# Patient Record
Sex: FEMALE | Race: WHITE | Employment: FULL TIME | URBAN - METROPOLITAN AREA
[De-identification: names, ages, dates, MRNs, and addresses within clinical notes are randomized per-mention and may not be internally consistent; named-entity substitution may affect disease eponyms.]

---

## 2017-03-02 ENCOUNTER — HOSPITAL ENCOUNTER (OUTPATIENT)
Dept: MRI IMAGING | Age: 52
Discharge: HOME | End: 2017-03-02
Attending: ORTHOPAEDIC SURGERY
Payer: COMMERCIAL

## 2017-03-02 ENCOUNTER — HOSPITAL ENCOUNTER (OUTPATIENT)
Dept: GENERAL RADIOLOGY | Age: 52
Discharge: HOME | End: 2017-03-02
Attending: ORTHOPAEDIC SURGERY
Payer: COMMERCIAL

## 2017-03-02 DIAGNOSIS — S43.002D UNSPECIFIED SUBLUXATION OF LEFT SHOULDER JOINT, SUBSEQUENT ENCOUNTER: ICD-10-CM

## 2017-03-02 PROCEDURE — A9585 GADOBUTROL INJECTION: HCPCS | Performed by: ORTHOPAEDIC SURGERY

## 2017-03-02 PROCEDURE — 74011250636 HC RX REV CODE- 250/636: Performed by: ORTHOPAEDIC SURGERY

## 2017-03-02 PROCEDURE — 73222 MRI JOINT UPR EXTREM W/DYE: CPT

## 2017-03-02 PROCEDURE — 74011636320 HC RX REV CODE- 636/320: Performed by: RADIOLOGY

## 2017-03-02 PROCEDURE — 23350 INJECTION FOR SHOULDER X-RAY: CPT

## 2017-03-02 PROCEDURE — 74011000250 HC RX REV CODE- 250: Performed by: RADIOLOGY

## 2017-03-02 RX ORDER — SODIUM CHLORIDE 9 MG/ML
3 INJECTION INTRAMUSCULAR; INTRAVENOUS; SUBCUTANEOUS
Status: COMPLETED | OUTPATIENT
Start: 2017-03-02 | End: 2017-03-02

## 2017-03-02 RX ORDER — LIDOCAINE HYDROCHLORIDE 10 MG/ML
10 INJECTION INFILTRATION; PERINEURAL
Status: COMPLETED | OUTPATIENT
Start: 2017-03-02 | End: 2017-03-02

## 2017-03-02 RX ADMIN — SODIUM CHLORIDE 10 ML: 9 INJECTION INTRAMUSCULAR; INTRAVENOUS; SUBCUTANEOUS at 15:40

## 2017-03-02 RX ADMIN — GADOBUTROL 2 ML: 604.72 INJECTION INTRAVENOUS at 17:00

## 2017-03-02 RX ADMIN — IOHEXOL 30 ML: 300 INJECTION, SOLUTION INTRAVENOUS at 15:40

## 2017-03-02 RX ADMIN — LIDOCAINE HYDROCHLORIDE 10 ML: 10 INJECTION, SOLUTION INFILTRATION; PERINEURAL at 15:40

## 2017-03-02 RX ADMIN — SODIUM BICARBONATE 5 ML: 0.2 INJECTION, SOLUTION INTRAVENOUS at 15:40

## 2017-08-07 RX ORDER — METHYLPHENIDATE HYDROCHLORIDE 20 MG/1
20 TABLET ORAL 2 TIMES DAILY
Qty: 60 TAB | Refills: 0 | Status: SHIPPED | OUTPATIENT
Start: 2017-08-07 | End: 2017-09-19 | Stop reason: SDUPTHER

## 2017-08-07 NOTE — TELEPHONE ENCOUNTER
Last Refill: 6/30/17  Last visit:6/30/17    Requested Prescriptions     Pending Prescriptions Disp Refills    methylphenidate HCl (RITALIN) 20 mg tablet 60 Tab 0     Sig: Take 1 Tab (20 mg total) by mouth two (2) times a day.   Max Daily Amount: 40 mg

## 2017-09-18 ENCOUNTER — PATIENT MESSAGE (OUTPATIENT)
Dept: INTERNAL MEDICINE CLINIC | Age: 52
End: 2017-09-18

## 2017-09-19 RX ORDER — METHYLPHENIDATE HYDROCHLORIDE 20 MG/1
20 TABLET ORAL 2 TIMES DAILY
Qty: 60 TAB | Refills: 0 | Status: SHIPPED | OUTPATIENT
Start: 2017-09-19 | End: 2017-10-23 | Stop reason: SDUPTHER

## 2017-09-19 NOTE — TELEPHONE ENCOUNTER
----- Message from Mehran Chang sent at 9/18/2017  3:36 PM EDT -----  Regarding: Prescription Question  Contact: [phone]  Can you please mail a prescription for methylphenidate 20 MG to the home address you have on file for me?     Thank you,  Azeem Rose

## 2017-10-23 RX ORDER — METHYLPHENIDATE HYDROCHLORIDE 20 MG/1
20 TABLET ORAL 2 TIMES DAILY
Qty: 60 TAB | Refills: 0 | Status: SHIPPED | OUTPATIENT
Start: 2017-10-23 | End: 2017-12-01 | Stop reason: SDUPTHER

## 2017-10-23 NOTE — TELEPHONE ENCOUNTER
Requested Prescriptions     Pending Prescriptions Disp Refills    methylphenidate HCl (RITALIN) 20 mg tablet 60 Tab 0     Sig: Take 1 Tab (20 mg total) by mouth two (2) times a day.   Max Daily Amount: 40 mg       Last Refill: 9-19-17  Last visit:6-30-17

## 2017-12-01 NOTE — TELEPHONE ENCOUNTER
Requested Prescriptions     Pending Prescriptions Disp Refills    methylphenidate HCl (RITALIN) 20 mg tablet 60 Tab 0     Sig: Take 1 Tab (20 mg total) by mouth two (2) times a day.   Max Daily Amount: 40 mg       Last Refill: 10-23-17  Last visit:6/30/2017

## 2017-12-04 RX ORDER — METHYLPHENIDATE HYDROCHLORIDE 20 MG/1
20 TABLET ORAL 2 TIMES DAILY
Qty: 60 TAB | Refills: 0 | Status: SHIPPED | OUTPATIENT
Start: 2017-12-04 | End: 2018-01-23 | Stop reason: SDUPTHER

## 2017-12-20 PROBLEM — E66.9 OBESITY (BMI 30-39.9): Status: ACTIVE | Noted: 2017-12-20

## 2017-12-20 PROBLEM — G47.11 IDIOPATHIC HYPERSOMNIA: Status: ACTIVE | Noted: 2017-12-20

## 2017-12-20 PROBLEM — J30.9 ALLERGIC RHINITIS: Status: ACTIVE | Noted: 2017-12-20

## 2017-12-20 PROBLEM — G47.26 SHIFT WORK SLEEP DISORDER: Status: ACTIVE | Noted: 2017-12-20

## 2017-12-20 PROBLEM — I10 ESSENTIAL HYPERTENSION: Status: ACTIVE | Noted: 2017-12-20

## 2017-12-20 PROBLEM — K50.90 CROHN'S DISEASE (HCC): Status: ACTIVE | Noted: 2017-12-20

## 2017-12-20 PROBLEM — F32.A DEPRESSION: Status: ACTIVE | Noted: 2017-12-20

## 2017-12-22 RX ORDER — MODAFINIL 200 MG/1
200 TABLET ORAL DAILY
COMMUNITY
End: 2018-01-02 | Stop reason: ALTCHOICE

## 2017-12-22 RX ORDER — HYDROCHLOROTHIAZIDE 25 MG/1
25 TABLET ORAL DAILY
COMMUNITY
End: 2018-03-05 | Stop reason: SDUPTHER

## 2017-12-22 RX ORDER — CITALOPRAM 20 MG/1
TABLET, FILM COATED ORAL DAILY
COMMUNITY
End: 2018-02-20 | Stop reason: SDUPTHER

## 2017-12-22 RX ORDER — MESALAMINE 500 MG/1
500 CAPSULE, EXTENDED RELEASE ORAL 3 TIMES DAILY
COMMUNITY

## 2018-01-02 ENCOUNTER — OFFICE VISIT (OUTPATIENT)
Dept: INTERNAL MEDICINE CLINIC | Age: 53
End: 2018-01-02

## 2018-01-02 VITALS
WEIGHT: 255.6 LBS | SYSTOLIC BLOOD PRESSURE: 130 MMHG | BODY MASS INDEX: 41.08 KG/M2 | HEART RATE: 70 BPM | DIASTOLIC BLOOD PRESSURE: 80 MMHG | HEIGHT: 66 IN

## 2018-01-02 DIAGNOSIS — K50.90 CROHN'S DISEASE WITHOUT COMPLICATION, UNSPECIFIED GASTROINTESTINAL TRACT LOCATION (HCC): ICD-10-CM

## 2018-01-02 DIAGNOSIS — I10 ESSENTIAL HYPERTENSION: Primary | ICD-10-CM

## 2018-01-02 DIAGNOSIS — F32.A DEPRESSION, UNSPECIFIED DEPRESSION TYPE: ICD-10-CM

## 2018-01-02 DIAGNOSIS — E66.01 OBESITY, MORBID (HCC): ICD-10-CM

## 2018-01-02 DIAGNOSIS — G47.11 IDIOPATHIC HYPERSOMNIA: ICD-10-CM

## 2018-01-02 NOTE — PROGRESS NOTES
This note will not be viewable in [address] Delfina Turner is a 47 y.o. female and presents with Hypertension (6 mo fu)  . Subjective:  Mrs. Odessa Vaca returns to our office today in follow-up of multiple medical problems. The patient has hypertension for which she is on hydrochlorothiazide. She tolerates medicine without dizziness, muscle cramping or lower extremity edema. She has had no headaches, numbness, tingling or focal neurological problems. She has had a history of depression and is on Celexa. She tolerates this without any headaches or GI symptoms. Depressive symptomatology has been well controlled and there is been no exacerbation over the holidays. The patient has Crohn's disease without complication. She is currently on Pentasa for this. She did have a colonoscopy in August.  She denies diarrhea or bleeding. The patient has idiopathic hypersomnia. She currently is on twice daily Ritalin for this. She states if it was not for the Ritalin she would sleep all day. She does sleep well at night. She is now working day shift and has not had to take any type of medication for nighttime work. The patient has morbid obesity and states that she will be starting a diet. She is not exercising due to her work. Past Medical History:   Diagnosis Date    Allergic rhinitis 12/20/2017    Crohn's disease ([address] 12/20/2017    Depression 12/20/2017    Essential hypertension 12/20/2017    Idiopathic hypersomnia 12/20/2017    Obesity (BMI 30-39.9) 12/20/2017    Shift work sleep disorder 12/20/2017     Past Surgical History:   Procedure Laterality Date    HX ORTHOPAEDIC       No Known Allergies  Current Outpatient Prescriptions   Medication Sig Dispense Refill    citalopram (CELEXA) 20 mg tablet Take  by mouth daily.  hydroCHLOROthiazide (HYDRODIURIL) 25 mg tablet Take 25 mg by mouth daily.  mesalamine (PENTASA) 500 mg CR capsule Take 500 mg by mouth four (4) times daily.       methylphenidate HCl (RITALIN) 20 mg tablet Take 1 Tab (20 mg total) by mouth two (2) times a dayEarliest Fill Date: 12/4/17.   Max Daily Amount: 40 mg 60 Tab 0     Social History     Social History    Marital status:      Spouse name: N/A    Number of children: N/A    Years of education: N/A     Social History Main Topics    Smoking status: Never Smoker    Smokeless tobacco: Never Used    Alcohol use None    Drug use: None    Sexual activity: Not Asked     Other Topics Concern    None     Social History Narrative     Family History   Problem Relation Age of Onset    Hypertension Mother     Diabetes Mother     Diabetes Father     Cancer Father        Health Maintenance   Topic Date Due    Hepatitis C Screening  1965    DTaP/Tdap/Td series (1 - Tdap) 02/18/1985    PAP AKA CERVICAL CYTOLOGY  02/18/1985    FOBT Q 1 YEAR AGE 50-75  02/18/2014    Influenza Age 9 to Adult  Completed        Review of Systems  Constitutional: negative for fevers, chills, anorexia and weight loss  Eyes:   negative for visual disturbance and irritation  ENT:   negative for tinnitus,sore throat,nasal congestion,ear pain,hoarseness  Respiratory:  negative for cough, hemoptysis, dyspnea,wheezing  CV:   negative for chest pain, palpitations, lower extremity edema  GI:   negative for nausea, vomiting, diarrhea, abdominal pain,melena  Endo:               negative for polyuria,polydipsia,polyphagia,heat intolerance  Genitourinary: negative for frequency, dysuria and hematuria  Integumentary: negative for rash and pruritus  Hematologic:  negative for easy bruising and gum/nose bleeding  Musculoskel: negative for myalgias, arthralgias, back pain, muscle weakness, joint pain  Neurological:  negative for headaches, dizziness, vertigo, memory problems and gait   Behavl/Psych: negative for feelings of anxiety, depression, mood changes  ROS otherwise negative      Objective:  Visit Vitals    /80 (BP 1 Location: Right arm, BP Patient Position: Sitting)    Pulse 70    Ht 5' 6\" (1.676 m)    Wt 255 lb 9.6 oz (115.9 kg)    BMI 41.25 kg/m2     Body mass index is 41.25 kg/(m^2). Physical Exam:   General appearance - alert, well appearing, and in no distress  Mental status - alert, oriented to person, place, and time  EYE-ANA MARIA, EOMI,conjunctiva normal bilaterally, lids normal  ENT-ENT exam normal, no neck nodes or sinus tenderness  Nose - normal and patent, no erythema,  Or discharge   Mouth - mucous membranes moist, pharynx normal without lesions  Neck - supple, no significant adenopathy or bruit  Chest - clear to auscultation, no wheezes, rales or rhonchi. Heart - normal rate, regular rhythm, normal S1, S2, no murmurs, rubs, clicks or gallops   Abdomen - soft, nontender, nondistended, no masses or organomegaly  Lymph- no adenopathy palpable  Ext-peripheral pulses normal, no pedal edema, no clubbing or cyanosis  Skin-Warm and dry. no hyperpigmentation, vitiligo, or suspicious lesions  Neuro -alert, oriented, normal speech, no focal findings or movement disorder noted      Assessment/Plan:  Diagnoses and all orders for this visit:    Essential hypertension    Idiopathic hypersomnia    Crohn's disease without complication, unspecified gastrointestinal tract location [facility])    Depression, unspecified depression type    Obesity, morbid ([facility] [address]        Other instructions: The patient's medications are reviewed and reconciled. No change in her current medical regimen is made. The patient's body mass index is 41.25 and dietary counseling and printed patient education is given. Labs from June 30 were reviewed with the patient. She is up-to-date on influenza vaccination    Follow-up 6 months    Follow-up Disposition:  Return in about 6 months (around 7/2/2018). I have reviewed with the patient details of the assessment and plan and all questions were answered. Relevent patient education was performed. The most recent lab findings were reviewed with the patient. An After Visit Summary was printed and given to the patient.     Richardson Severino MD

## 2018-01-02 NOTE — PATIENT INSTRUCTIONS

## 2018-01-02 NOTE — MR AVS SNAPSHOT
Visit Information Date & Time Provider Department Dept. Phone Encounter #  
 1/2/2018  8:00 AM Juan Lambert MD [phone] [id number] Follow-up Instructions Return in about 6 months (around 7/2/2018). Upcoming Health Maintenance Date Due Hepatitis C Screening 1965 DTaP/Tdap/Td series (1 - Tdap) 2/18/1985 PAP AKA CERVICAL CYTOLOGY 2/18/1985 FOBT Q 1 YEAR AGE 50-75 2/18/2014 Allergies as of 1/2/2018  Review Complete On: 1/2/2018 By: Juan Lambert MD  
 No Known Allergies Current Immunizations  Never Reviewed Name Date Influenza Vaccine 11/2/2017 Not reviewed this visit You Were Diagnosed With   
  
 Codes Comments Essential hypertension    -  Primary ICD-10-CM: I10 
ICD-9-CM: 401.9 Idiopathic hypersomnia     ICD-10-CM: G47.11 ICD-9-CM: 780.54 Crohn's disease without complication, unspecified gastrointestinal tract location [facility])     ICD-10-CM: K50.90 ICD-9-CM: 555.9 Depression, unspecified depression type     ICD-10-CM: F32.9 ICD-9-CM: 844 Obesity, morbid ([address]     ICD-10-CM: E66.01 
ICD-9-CM: 278.01 Vitals BP Pulse Height(growth percentile) Weight(growth percentile) BMI Smoking Status 130/80 (BP 1 Location: Right arm, BP Patient Position: Sitting) 70 5' 6\" (1.676 m) 255 lb 9.6 oz (115.9 kg) 41.25 kg/m2 Never Smoker Vitals History BMI and BSA Data Body Mass Index Body Surface Area  
 41.25 kg/m 2 2.32 m 2 Your Updated Medication List  
  
   
This list is accurate as of: 1/2/18  8:14 AM.  Always use your most recent med list.  
  
  
  
  
 CeleXA 20 mg tablet Generic drug:  citalopram  
Take  by mouth daily. hydroCHLOROthiazide 25 mg tablet Commonly known as:  HYDRODIURIL Take 25 mg by mouth daily. methylphenidate HCl 20 mg tablet Commonly known as:  RITALIN  
 Take 1 Tab (20 mg total) by mouth two (2) times a dayEarliest Fill Date: 12/4/17. Max Daily Amount: 40 mg PENTASA 500 mg CR capsule Generic drug:  mesalamine Take 500 mg by mouth four (4) times daily. Follow-up Instructions Return in about 6 months (around 7/2/2018). Introducing [facility] & HEALTH SERVICES! Dear Anselmo Kunz: Thank you for requesting a [employer] account. Our records indicate that you already have an active [employer] account. You can access your account anytime at [email or web address] Did you know that you can access your hospital and ER discharge instructions at any time in [employer]? You can also review all of your test results from your hospital stay or ER visit. Additional Information If you have questions, please visit the Frequently Asked Questions section of the [employer] website at [email or web address] Remember, [employer] is NOT to be used for urgent needs. For medical emergencies, dial 911. Now available from your iPhone and Android! Please provide this summary of care documentation to your next provider. Your primary care clinician is listed as SHERRY Metcalf. If you have any questions after today's visit, please call [phone].

## 2018-01-02 NOTE — PROGRESS NOTES
John Campbell is a 47 y.o. female presenting for Hypertension (6 mo fu)  . 1. Have you been to the ER, urgent care clinic since your last visit? Hospitalized since your last visit? No    2. Have you seen or consulted any other health care providers outside of the [address] [place] [address] since your last visit? Include any pap smears or colon screening. No    No flowsheet data found. No flowsheet data found. No flowsheet data found. There are no discontinued medications.

## 2018-01-23 DIAGNOSIS — G47.11 IDIOPATHIC HYPERSOMNIA: Primary | ICD-10-CM

## 2018-01-23 RX ORDER — METHYLPHENIDATE HYDROCHLORIDE 20 MG/1
20 TABLET ORAL 2 TIMES DAILY
Qty: 60 TAB | Refills: 0 | Status: SHIPPED | OUTPATIENT
Start: 2018-01-23 | End: 2018-02-26 | Stop reason: SDUPTHER

## 2018-01-23 NOTE — TELEPHONE ENCOUNTER
----- Message from Tameka Chang sent at 1/23/2018 10:03 AM EST -----  Regarding: Prescription Question  Contact: [phone]  Good morning,    Can you please mail a prescription for Methylphenidate 20 mg tablets to the address you have on file for me? Thank you!   Margert Romberg

## 2018-01-23 NOTE — TELEPHONE ENCOUNTER
Requested Prescriptions     Pending Prescriptions Disp Refills    methylphenidate HCl (RITALIN) 20 mg tablet 60 Tab 0     Sig: Take 1 Tab (20 mg total) by mouth two (2) times a day.   Max Daily Amount: 40 mg       Last Refill: 12-4-17  Last visit: 1/2/2018

## 2018-02-26 DIAGNOSIS — G47.11 IDIOPATHIC HYPERSOMNIA: ICD-10-CM

## 2018-02-26 RX ORDER — METHYLPHENIDATE HYDROCHLORIDE 20 MG/1
20 TABLET ORAL 2 TIMES DAILY
Qty: 60 TAB | Refills: 0 | Status: SHIPPED | OUTPATIENT
Start: 2018-02-26 | End: 2018-04-09 | Stop reason: SDUPTHER

## 2018-02-26 NOTE — TELEPHONE ENCOUNTER
Last Refill: 1/23/18  Last visit:1/2/2018      Requested Prescriptions     Pending Prescriptions Disp Refills    methylphenidate HCl (RITALIN) 20 mg tablet 60 Tab 0     Sig: Take 1 Tab (20 mg total) by mouth two (2) times a day.   Max Daily Amount: 40 mg

## 2018-03-05 RX ORDER — HYDROCHLOROTHIAZIDE 25 MG/1
25 TABLET ORAL DAILY
Qty: 90 TAB | Refills: 3 | Status: SHIPPED | OUTPATIENT
Start: 2018-03-05 | End: 2019-03-19 | Stop reason: SDUPTHER

## 2018-03-05 NOTE — TELEPHONE ENCOUNTER
Requested Prescriptions     Pending Prescriptions Disp Refills    hydroCHLOROthiazide (HYDRODIURIL) 25 mg tablet 90 Tab 3     Sig: Take 1 Tab by mouth daily.        Last Refill: 11-24-17  Last visit:1-2-18

## 2018-04-09 DIAGNOSIS — G47.11 IDIOPATHIC HYPERSOMNIA: ICD-10-CM

## 2018-04-09 NOTE — TELEPHONE ENCOUNTER
Last Refill: 2/26/2018  Last visit:1/2/2018    Requested Prescriptions     Pending Prescriptions Disp Refills    methylphenidate HCl (RITALIN) 20 mg tablet 60 Tab 0     Sig: Take 1 Tab (20 mg total) by mouth two (2) times a day.   Max Daily Amount: 40 mg

## 2018-04-10 RX ORDER — METHYLPHENIDATE HYDROCHLORIDE 20 MG/1
20 TABLET ORAL 2 TIMES DAILY
Qty: 60 TAB | Refills: 0 | Status: SHIPPED | OUTPATIENT
Start: 2018-04-10 | End: 2018-05-23 | Stop reason: SDUPTHER

## 2018-05-23 DIAGNOSIS — G47.11 IDIOPATHIC HYPERSOMNIA: ICD-10-CM

## 2018-05-23 RX ORDER — METHYLPHENIDATE HYDROCHLORIDE 20 MG/1
20 TABLET ORAL 2 TIMES DAILY
Qty: 60 TAB | Refills: 0 | Status: SHIPPED | OUTPATIENT
Start: 2018-05-23 | End: 2018-07-02 | Stop reason: SDUPTHER

## 2018-05-23 NOTE — TELEPHONE ENCOUNTER
Last Refill: 4/10/2018  Last visit:1/2/2018      Requested Prescriptions     Pending Prescriptions Disp Refills    methylphenidate HCl (RITALIN) 20 mg tablet 60 Tab 0     Sig: Take 1 Tab (20 mg total) by mouth two (2) times a day.   Max Daily Amount: 40 mg

## 2018-07-02 ENCOUNTER — OFFICE VISIT (OUTPATIENT)
Dept: INTERNAL MEDICINE CLINIC | Age: 53
End: 2018-07-02

## 2018-07-02 VITALS
OXYGEN SATURATION: 97 % | SYSTOLIC BLOOD PRESSURE: 118 MMHG | HEART RATE: 75 BPM | BODY MASS INDEX: 39.34 KG/M2 | WEIGHT: 244.8 LBS | DIASTOLIC BLOOD PRESSURE: 80 MMHG | HEIGHT: 66 IN

## 2018-07-02 DIAGNOSIS — I10 ESSENTIAL HYPERTENSION: Primary | ICD-10-CM

## 2018-07-02 DIAGNOSIS — G47.11 IDIOPATHIC HYPERSOMNIA: ICD-10-CM

## 2018-07-02 DIAGNOSIS — Z11.59 NEED FOR HEPATITIS C SCREENING TEST: ICD-10-CM

## 2018-07-02 DIAGNOSIS — E66.9 OBESITY (BMI 30-39.9): ICD-10-CM

## 2018-07-02 DIAGNOSIS — F32.A DEPRESSION, UNSPECIFIED DEPRESSION TYPE: ICD-10-CM

## 2018-07-02 RX ORDER — METHYLPHENIDATE HYDROCHLORIDE 20 MG/1
20 TABLET ORAL 2 TIMES DAILY
Qty: 60 TAB | Refills: 0 | Status: SHIPPED | OUTPATIENT
Start: 2018-07-02 | End: 2018-07-30 | Stop reason: SDUPTHER

## 2018-07-02 NOTE — MR AVS SNAPSHOT
[address] [facility] [address] [address] [phone] Patient: Triston Wise MRN: [id number] :1965 Visit Information Date & Time Provider Department Dept. Phone Encounter #  
 2018  8:00 AM Johny Fernandez MD [facility] [id number] Follow-up Instructions Return in about 6 months (around 2019). Upcoming Health Maintenance Date Due Hepatitis C Screening 1965 DTaP/Tdap/Td series (1 - Tdap) 1985 PAP AKA CERVICAL CYTOLOGY 1985 BREAST CANCER SCRN MAMMOGRAM 2014 FOBT Q 1 YEAR AGE 50-75 2014 Influenza Age 5 to Adult 2018 Allergies as of 2018  Review Complete On: 2018 By: Johny Fernandez MD  
 No Known Allergies Current Immunizations  Never Reviewed Name Date Influenza Vaccine 2017 Not reviewed this visit You Were Diagnosed With   
  
 Codes Comments Essential hypertension    -  Primary ICD-10-CM: I10 
ICD-9-CM: 401.9 Depression, unspecified depression type     ICD-10-CM: F32.9 ICD-9-CM: 776 Idiopathic hypersomnia     ICD-10-CM: G47.11 ICD-9-CM: 780.54 Obesity (BMI 30-39. 9)     ICD-10-CM: E66.9 ICD-9-CM: 278.00 Need for hepatitis C screening test     ICD-10-CM: Z11.59 
ICD-9-CM: V73.89 Vitals BP Pulse Height(growth percentile) Weight(growth percentile) SpO2 BMI  
 118/80 (BP 1 Location: Right arm, BP Patient Position: Sitting) 75 5' 6\" (1.676 m) 244 lb 12.8 oz (111 kg) 97% 39.51 kg/m2 Smoking Status Never Smoker BMI and BSA Data Body Mass Index Body Surface Area  
 39.51 kg/m 2 2.27 m 2 Preferred Pharmacy Pharmacy Name Phone [id number] [facility] [phone] Your Updated Medication List  
  
   
 This list is accurate as of 7/2/18  8:23 AM.  Always use your most recent med list.  
  
  
  
  
 citalopram 20 mg tablet Commonly known as:  Eilleen Burner Take 1 Tab by mouth daily. hydroCHLOROthiazide 25 mg tablet Commonly known as:  HYDRODIURIL Take 1 Tab by mouth daily. methylphenidate HCl 20 mg tablet Commonly known as:  RITALIN Take 1 Tab (20 mg total) by mouth two (2) times a day. Max Daily Amount: 40 mg PENTASA 500 mg CR capsule Generic drug:  mesalamine Take 500 mg by mouth four (4) times daily. Prescriptions Printed Refills  
 methylphenidate HCl (RITALIN) 20 mg tablet 0 Sig: Take 1 Tab (20 mg total) by mouth two (2) times a day. Max Daily Amount: 40 mg  
 Class: Print Route: Oral  
  
We Performed the Following CBC WITH AUTOMATED DIFF [70456 CPT(R)] COLLECTION VENOUS BLOOD,VENIPUNCTURE [id number] CPT(R)] HEPATITIS C AB [04902 CPT(R)] LIPID PANEL [10671 CPT(R)] METABOLIC PANEL, COMPREHENSIVE [74795 CPT(R)] TSH 3RD GENERATION [75201 CPT(R)] URINALYSIS W/ RFLX MICROSCOPIC [45184 CPT(R)] Follow-up Instructions Return in about 6 months (around 1/2/2019). Patient Instructions Learning About Cutting Calories How do calories affect your weight? Food gives your body energy. Energy from the food you eat is measured in calories. This energy keeps your heart beating, your brain active, and your muscles working. Your body needs a certain number of calories each day. After your body uses the calories it needs, it stores extra calories as fat. To lose weight safely, you have to eat fewer calories while eating in a healthy way. How many calories do you need each day? The more active you are, the more calories you need. When you are less active, you need fewer calories. How many calories you need each day also depends on several things, including your age and whether you are male or female. Here are some general guidelines for adults: 
· Less active women and older adults need 1,600 to 2,000 calories each day. · Active women and less active men need 2,000 to 2,400 calories each day. · Active men need 2,400 to 3,000 calories each day. How can you cut calories and eat healthy meals? Whole grains, vegetables and fruits, and dried beans are good lower-calorie foods. They give you lots of nutrients and fiber. And they fill you up. Sweets, energy drinks, and soda pop are high in calories. They give you few nutrients and no fiber. Try to limit soda pop, fruit juice, and energy drinks. Drink water instead. Some fats can be part of a healthy diet. But cutting back on fats from highly processed foods like fast foods and many snack foods is a good way to lower the calories in your diet. Also, use smaller amounts of fats like butter, margarine, salad dressing, and mayonnaise. Add fresh garlic, lemon, or herbs to your meals to add flavor without adding fat. Meats and dairy products can be a big source of hidden fats. Try to choose lean or low-fat versions of these products. Fat-free cookies, candies, chips, and frozen treats can still be high in sugar and calories. Some fat-free foods have more calories than regular ones. Eat fat-free treats in moderation, as you would other foods. If your favorite foods are high in fat, salt, sugar, or calories, limit how often you eat them. Eat smaller servings, or look for healthy substitutes. Fill up on fruits, vegetables, and whole grains. Eating at home · Use meat as a side dish instead of as the main part of your meal. 
· Try main dishes that use whole wheat pasta, brown rice, dried beans, or vegetables. · Find ways to cook with little or no fat, such as broiling, steaming, or grilling. · Use cooking spray instead of oil. If you use oil, use a monounsaturated oil, such as canola or olive oil. · Trim fat from meats before you cook them. · Drain off fat after you brown the meat or while you roast it. · Chill soups and stews after you cook them. Then skim the fat off the top after it hardens. Eating out · Order foods that are broiled or poached rather than fried or breaded. · Cut back on the amount of butter or margarine that you use on bread. · Order sauces, gravies, and salad dressings on the side, and use only a little. · When you order pasta, choose tomato sauce rather than cream sauce. · Ask for salsa with your baked potato instead of sour cream, butter, cheese, or guidry. · Order meals in a small size instead of upgrading to a large. · Share an entree, or take part of your food home to eat as another meal. 
· Share appetizers and desserts. Where can you learn more? Go to http://doe-melani.info/. Enter 99 [id number] in the search box to learn more about \"Learning About Cutting Calories. \" Current as of: May 12, 2017 Content Version: 11.4 © 5389-8461 [employer]. Care instructions adapted under license by [employer] (which disclaims liability or warranty for this information). If you have questions about a medical condition or this instruction, always ask your healthcare professional. [address] any warranty or liability for your use of this information. Introducing [facility] & HEALTH SERVICES! Dear Gil Hicks: Thank you for requesting a [employer] account. Our records indicate that you already have an active [employer] account. You can access your account anytime at [email or web address] Did you know that you can access your hospital and ER discharge instructions at any time in [employer]? You can also review all of your test results from your hospital stay or ER visit. Additional Information If you have questions, please visit the Frequently Asked Questions section of the [employer] website at [email or web address] Remember, MyChart is NOT to be used for urgent needs. For medical emergencies, dial 911. Now available from your iPhone and Android! Please provide this summary of care documentation to your next provider. Your primary care clinician is listed as SHERRY Metcalf. If you have any questions after today's visit, please call [phone].

## 2018-07-02 NOTE — PROGRESS NOTES
Isabel Muro is a 46 y.o. female presenting for Hypertension (6 mo fu)  . 1. Have you been to the ER, urgent care clinic since your last visit? Hospitalized since your last visit? No    2. Have you seen or consulted any other health care providers outside of the [address] [place] [address] since your last visit? Include any pap smears or colon screening. Yes When: April 2018 Where: [facility]) Reason for visit: rash. No flowsheet data found. No flowsheet data found. PHQ over the last two weeks 7/2/2018   Little interest or pleasure in doing things Several days   Feeling down, depressed or hopeless Several days   Total Score PHQ 2 2   Trouble falling or staying asleep, or sleeping too much Several days   Feeling tired or having little energy Nearly every day   Poor appetite or overeating Not at all   Feeling bad about yourself - or that you are a failure or have let yourself or your family down Not at all   Trouble concentrating on things such as school, work, reading or watching TV Not at all   Moving or speaking so slowly that other people could have noticed; or the opposite being so fidgety that others notice Not at all   Thoughts of being better off dead, or hurting yourself in some way Not at all   PHQ 9 Score 6       There are no discontinued medications.

## 2018-07-02 NOTE — PATIENT INSTRUCTIONS

## 2018-07-03 LAB
ALBUMIN SERPL-MCNC: 4 G/DL (ref 3.5–5.5)
ALBUMIN/GLOB SERPL: 1.5 {RATIO} (ref 1.2–2.2)
ALP SERPL-CCNC: 119 IU/L (ref 39–117)
ALT SERPL-CCNC: 14 IU/L (ref 0–32)
APPEARANCE UR: CLEAR
AST SERPL-CCNC: 15 IU/L (ref 0–40)
BACTERIA #/AREA URNS HPF: ABNORMAL /[HPF]
BASOPHILS # BLD AUTO: 0 X10E3/UL (ref 0–0.2)
BASOPHILS NFR BLD AUTO: 0 %
BILIRUB SERPL-MCNC: 0.2 MG/DL (ref 0–1.2)
BILIRUB UR QL STRIP: NEGATIVE
BUN SERPL-MCNC: 16 MG/DL (ref 6–24)
BUN/CREAT SERPL: 19 (ref 9–23)
CALCIUM SERPL-MCNC: 9 MG/DL (ref 8.7–10.2)
CASTS URNS QL MICRO: ABNORMAL /LPF
CHLORIDE SERPL-SCNC: 101 MMOL/L (ref 96–106)
CHOLEST SERPL-MCNC: 157 MG/DL (ref 100–199)
CO2 SERPL-SCNC: 27 MMOL/L (ref 20–29)
COLOR UR: YELLOW
CREAT SERPL-MCNC: 0.84 MG/DL (ref 0.57–1)
EOSINOPHIL # BLD AUTO: 0.3 X10E3/UL (ref 0–0.4)
EOSINOPHIL NFR BLD AUTO: 5 %
EPI CELLS #/AREA URNS HPF: ABNORMAL /HPF
ERYTHROCYTE [DISTWIDTH] IN BLOOD BY AUTOMATED COUNT: 14.1 % (ref 12.3–15.4)
GLOBULIN SER CALC-MCNC: 2.7 G/DL (ref 1.5–4.5)
GLUCOSE SERPL-MCNC: 114 MG/DL (ref 65–99)
GLUCOSE UR QL: NEGATIVE
HCT VFR BLD AUTO: 41.5 % (ref 34–46.6)
HCV AB S/CO SERPL IA: 0.1 S/CO RATIO (ref 0–0.9)
HDLC SERPL-MCNC: 54 MG/DL
HGB BLD-MCNC: 13.9 G/DL (ref 11.1–15.9)
HGB UR QL STRIP: NEGATIVE
IMM GRANULOCYTES # BLD: 0 X10E3/UL (ref 0–0.1)
IMM GRANULOCYTES NFR BLD: 0 %
KETONES UR QL STRIP: NEGATIVE
LDLC SERPL CALC-MCNC: 89 MG/DL (ref 0–99)
LEUKOCYTE ESTERASE UR QL STRIP: ABNORMAL
LYMPHOCYTES # BLD AUTO: 1.8 X10E3/UL (ref 0.7–3.1)
LYMPHOCYTES NFR BLD AUTO: 25 %
MCH RBC QN AUTO: 31.4 PG (ref 26.6–33)
MCHC RBC AUTO-ENTMCNC: 33.5 G/DL (ref 31.5–35.7)
MCV RBC AUTO: 94 FL (ref 79–97)
MICRO URNS: ABNORMAL
MONOCYTES # BLD AUTO: 0.4 X10E3/UL (ref 0.1–0.9)
MONOCYTES NFR BLD AUTO: 5 %
MUCOUS THREADS URNS QL MICRO: PRESENT
NEUTROPHILS # BLD AUTO: 4.5 X10E3/UL (ref 1.4–7)
NEUTROPHILS NFR BLD AUTO: 65 %
NITRITE UR QL STRIP: NEGATIVE
PH UR STRIP: 6.5 [PH] (ref 5–7.5)
PLATELET # BLD AUTO: 236 X10E3/UL (ref 150–379)
POTASSIUM SERPL-SCNC: 4.3 MMOL/L (ref 3.5–5.2)
PROT SERPL-MCNC: 6.7 G/DL (ref 6–8.5)
PROT UR QL STRIP: NEGATIVE
RBC # BLD AUTO: 4.42 X10E6/UL (ref 3.77–5.28)
RBC #/AREA URNS HPF: ABNORMAL /HPF
SODIUM SERPL-SCNC: 143 MMOL/L (ref 134–144)
SP GR UR: 1.02 (ref 1–1.03)
TRIGL SERPL-MCNC: 71 MG/DL (ref 0–149)
TSH SERPL DL<=0.005 MIU/L-ACNC: 2.83 UIU/ML (ref 0.45–4.5)
UROBILINOGEN UR STRIP-MCNC: 0.2 MG/DL (ref 0.2–1)
VLDLC SERPL CALC-MCNC: 14 MG/DL (ref 5–40)
WBC # BLD AUTO: 7 X10E3/UL (ref 3.4–10.8)
WBC #/AREA URNS HPF: ABNORMAL /HPF

## 2018-07-29 ENCOUNTER — PATIENT MESSAGE (OUTPATIENT)
Dept: INTERNAL MEDICINE CLINIC | Age: 53
End: 2018-07-29

## 2018-07-29 DIAGNOSIS — G47.11 IDIOPATHIC HYPERSOMNIA: ICD-10-CM

## 2018-07-30 RX ORDER — METHYLPHENIDATE HYDROCHLORIDE 20 MG/1
20 TABLET ORAL 2 TIMES DAILY
Qty: 60 TAB | Refills: 0 | Status: SHIPPED | OUTPATIENT
Start: 2018-07-30 | End: 2018-09-06 | Stop reason: SDUPTHER

## 2018-07-30 NOTE — TELEPHONE ENCOUNTER
----- Message from Payton Chang sent at 7/29/2018 12:44 PM EDT -----  Regarding: Prescription Question  Contact: [phone]  Good morning,    Can you please mail a prescription for methylphenidate to my home address on file? Thank you. Ingris Tejeda       Last Refill: 7/2/2018  Last visit:7/2/2018      Requested Prescriptions     Pending Prescriptions Disp Refills    methylphenidate HCl (RITALIN) 20 mg tablet 60 Tab 0     Sig: Take 1 Tab (20 mg total) by mouth two (2) times a day.   Max Daily Amount: 40 mg

## 2018-09-06 ENCOUNTER — PATIENT MESSAGE (OUTPATIENT)
Dept: INTERNAL MEDICINE CLINIC | Age: 53
End: 2018-09-06

## 2018-09-06 DIAGNOSIS — G47.11 IDIOPATHIC HYPERSOMNIA: ICD-10-CM

## 2018-09-06 RX ORDER — METHYLPHENIDATE HYDROCHLORIDE 20 MG/1
20 TABLET ORAL 2 TIMES DAILY
Qty: 60 TAB | Refills: 0 | Status: SHIPPED | OUTPATIENT
Start: 2018-09-06 | End: 2018-10-17 | Stop reason: SDUPTHER

## 2018-09-06 NOTE — TELEPHONE ENCOUNTER
----- Message from Nikki Chang sent at 9/6/2018  3:39 PM EDT -----  Regarding: Prescription Question  Contact: [phone]  Can you please mail a prescription for methylphenidate 20 mg tablets to the address you have on file for me?     Thanks,  Suyapa Pate

## 2018-10-09 ENCOUNTER — OFFICE VISIT (OUTPATIENT)
Dept: SLEEP MEDICINE | Age: 53
End: 2018-10-09

## 2018-10-09 VITALS
BODY MASS INDEX: 38.57 KG/M2 | DIASTOLIC BLOOD PRESSURE: 90 MMHG | HEIGHT: 66 IN | SYSTOLIC BLOOD PRESSURE: 133 MMHG | WEIGHT: 240 LBS

## 2018-10-09 DIAGNOSIS — G47.12 IDIOPATHIC HYPERSOMNIA WITHOUT LONG SLEEP TIME: Primary | ICD-10-CM

## 2018-10-17 ENCOUNTER — PATIENT MESSAGE (OUTPATIENT)
Dept: INTERNAL MEDICINE CLINIC | Age: 53
End: 2018-10-17

## 2018-10-17 DIAGNOSIS — G47.11 IDIOPATHIC HYPERSOMNIA: ICD-10-CM

## 2018-10-17 RX ORDER — METHYLPHENIDATE HYDROCHLORIDE 20 MG/1
20 TABLET ORAL 2 TIMES DAILY
Qty: 60 TAB | Refills: 0 | Status: SHIPPED | OUTPATIENT
Start: 2018-10-17 | End: 2018-11-26 | Stop reason: SDUPTHER

## 2018-11-26 ENCOUNTER — PATIENT MESSAGE (OUTPATIENT)
Dept: INTERNAL MEDICINE CLINIC | Age: 53
End: 2018-11-26

## 2018-11-26 DIAGNOSIS — G47.11 IDIOPATHIC HYPERSOMNIA: ICD-10-CM

## 2018-11-26 RX ORDER — METHYLPHENIDATE HYDROCHLORIDE 20 MG/1
20 TABLET ORAL 2 TIMES DAILY
Qty: 60 TAB | Refills: 0 | Status: SHIPPED | OUTPATIENT
Start: 2018-11-26 | End: 2019-01-07 | Stop reason: SDUPTHER

## 2018-11-26 NOTE — TELEPHONE ENCOUNTER
From: Jamee Cardona  Sent: 11/26/2018 3:02 PM EST  Subject: Prescription Question    Good afternoon,    Two things - can you please mail a prescription for methylphenidate 20 mg tab to my home address on file? Also, can I reschedule my appointment on 1/7 to either 1/17 or 1/18? I prefer an early morning appointment, but I'll take whatever is available. Thanks for your help!     Jackie Montejo

## 2019-01-07 DIAGNOSIS — G47.11 IDIOPATHIC HYPERSOMNIA: ICD-10-CM

## 2019-01-07 RX ORDER — METHYLPHENIDATE HYDROCHLORIDE 20 MG/1
20 TABLET ORAL 2 TIMES DAILY
Qty: 60 TAB | Refills: 0 | Status: SHIPPED | OUTPATIENT
Start: 2019-01-07 | End: 2019-02-13 | Stop reason: SDUPTHER

## 2019-01-07 NOTE — TELEPHONE ENCOUNTER
Last Refill: 11/26/2018  Last visit:7/2/2018        Requested Prescriptions     Pending Prescriptions Disp Refills    methylphenidate HCl (RITALIN) 20 mg tablet 60 Tab 0     Sig: Take 1 Tab (20 mg total) by mouth two (2) times a day.   Max Daily Amount: 40 mg       Next appointment 1/17/19

## 2019-01-17 ENCOUNTER — OFFICE VISIT (OUTPATIENT)
Dept: INTERNAL MEDICINE CLINIC | Age: 54
End: 2019-01-17

## 2019-01-17 VITALS
HEIGHT: 66 IN | DIASTOLIC BLOOD PRESSURE: 84 MMHG | HEART RATE: 85 BPM | WEIGHT: 245 LBS | BODY MASS INDEX: 39.37 KG/M2 | OXYGEN SATURATION: 96 % | SYSTOLIC BLOOD PRESSURE: 134 MMHG

## 2019-01-17 DIAGNOSIS — E66.9 OBESITY (BMI 30-39.9): ICD-10-CM

## 2019-01-17 DIAGNOSIS — G47.11 IDIOPATHIC HYPERSOMNIA: ICD-10-CM

## 2019-01-17 DIAGNOSIS — K50.90 CROHN'S DISEASE WITHOUT COMPLICATION, UNSPECIFIED GASTROINTESTINAL TRACT LOCATION (HCC): ICD-10-CM

## 2019-01-17 DIAGNOSIS — F32.A DEPRESSION, UNSPECIFIED DEPRESSION TYPE: Primary | ICD-10-CM

## 2019-01-17 DIAGNOSIS — I10 ESSENTIAL HYPERTENSION: Chronic | ICD-10-CM

## 2019-01-17 NOTE — PROGRESS NOTES
Kmaar Sofia is a 46 y.o. female presenting for Hypertension (6 mo fu) Karrie Jerez 1. Have you been to the ER, urgent care clinic since your last visit? Hospitalized since your last visit? No 
 
2. Have you seen or consulted any other health care providers outside of the [address] [place] [address] since your last visit? Include any pap smears or colon screening. Sleep specialist & Dermatologist. 
 
No flowsheet data found. No flowsheet data found. PHQ over the last two weeks 7/2/2018 Little interest or pleasure in doing things Several days Feeling down, depressed, irritable, or hopeless Several days Total Score PHQ 2 2 Trouble falling or staying asleep, or sleeping too much Several days Feeling tired or having little energy Nearly every day Poor appetite, weight loss, or overeating Not at all Feeling bad about yourself - or that you are a failure or have let yourself or your family down Not at all Trouble concentrating on things such as school, work, reading, or watching TV Not at all Moving or speaking so slowly that other people could have noticed; or the opposite being so fidgety that others notice Not at all Thoughts of being better off dead, or hurting yourself in some way Not at all PHQ 9 Score 6 There are no discontinued medications.

## 2019-01-17 NOTE — PROGRESS NOTES
This note will not be viewable in [address] Ginny Martínez is a 46 y.o. female and presents with Hypertension (6 mo fu) Sylvester Cochran Subjective: 
Mrs. Gurwinder Centeno presents to the office today in follow-up of multiple medical problems. The patient has hypertension. She is on hydrochlorothiazide. She tolerates this without dizziness, muscle cramping or lower extremity edema. She has had no headaches, numbness, tingling or focal neurological problems. There is a history of depression for which she is on Celexa. She continues to tolerate the medication without side effect and has had no breakthrough symptoms during the dark months of the year. She has idiopathic hypersomnia and shift work sleep disorder. She is done great on Provigil however her insurance stopped covering this for her. She was switched to Ritalin which he tolerates but notes that it is not work as well as it does cause some side effects of palpitations and anxiety. Overall she is tolerating that but wishes she could go back to the Provigil. She does note problems with chronic fatigue. She has Crohn's disease currently on mesalamine. She has had no side effects related to the medication and is had no exacerbations of her Crohn's disease. Past Medical History:  
Diagnosis Date  Allergic rhinitis 12/20/2017  Crohn's disease ([address] 12/20/2017  Depression 12/20/2017  Essential hypertension 12/20/2017  Idiopathic hypersomnia 12/20/2017  Obesity (BMI 30-39.9) 12/20/2017  Shift work sleep disorder 12/20/2017 Past Surgical History:  
Procedure Laterality Date  HX ORTHOPAEDIC No Known Allergies Current Outpatient Medications Medication Sig Dispense Refill  methylphenidate HCl (RITALIN) 20 mg tablet Take 1 Tab (20 mg total) by mouth two (2) times a dayEarliest Fill Date: 1/7/19. Max Daily Amount: 40 mg 60 Tab 0  
 hydroCHLOROthiazide (HYDRODIURIL) 25 mg tablet Take 1 Tab by mouth daily.  90 Tab 3  
  citalopram (CELEXA) 20 mg tablet Take 1 Tab by mouth daily. 90 Tab 3  
 mesalamine (PENTASA) 500 mg CR capsule Take 500 mg by mouth four (4) times daily. Social History Socioeconomic History  Marital status:  Spouse name: Not on file  Number of children: Not on file  Years of education: Not on file  Highest education level: Not on file Tobacco Use  Smoking status: Never Smoker  Smokeless tobacco: Never Used Family History Problem Relation Age of Onset  Hypertension Mother  Diabetes Mother  Diabetes Father  Cancer Father Health Maintenance Topic Date Due  
 DTaP/Tdap/Td series (1 - Tdap) 02/18/1985  PAP AKA CERVICAL CYTOLOGY  02/18/1985  Shingrix Vaccine Age 50> (1 of 2) 02/18/2014  
 FOBT Q 1 YEAR AGE 50-75  02/18/2014  MEDICARE YEARLY EXAM  01/16/2019  BREAST CANCER SCRN MAMMOGRAM  07/09/2020  Hepatitis C Screening  Completed  Influenza Age 5 to Adult  Completed Review of Systems Constitutional: negative for fevers, chills, anorexia and weight loss Eyes:   negative for visual disturbance and irritation ENT:   negative for tinnitus,sore throat,nasal congestion,ear pain,hoarseness Respiratory:  negative for cough, hemoptysis, dyspnea,wheezing CV:   negative for chest pain, palpitations, lower extremity edema GI:   negative for nausea, vomiting, diarrhea, abdominal pain,melena Endo:               negative for polyuria,polydipsia,polyphagia,heat intolerance Genitourinary: negative for frequency, dysuria and hematuria Integumentary: negative for rash and pruritus Hematologic:  negative for easy bruising and gum/nose bleeding Musculoskel: negative for myalgias, arthralgias, back pain, muscle weakness, joint pain Neurological:  negative for headaches, dizziness, vertigo, memory problems and gait Behavl/Psych: negative for feelings of anxiety, depression, mood changes ROS otherwise negative Objective: Visit Vitals /84 Pulse 85 Ht 5' 6\" (1.676 m) Wt 245 lb (111.1 kg) SpO2 96% BMI 39.54 kg/m² Body mass index is 39.54 kg/m². Physical Exam:  
General appearance - alert, well appearing, and in no distress Mental status - alert, oriented to person, place, and time EYE-ANA MARIA, EOMI,conjunctiva normal bilaterally, lids normal 
ENT-ENT exam normal, no neck nodes or sinus tenderness Nose - normal and patent, no erythema,  Or discharge Mouth - mucous membranes moist, pharynx normal without lesions Neck - supple, no significant adenopathy or bruit Chest - clear to auscultation, no wheezes, rales or rhonchi. Heart - normal rate, regular rhythm, normal S1, S2, no murmurs, rubs, clicks or gallops Abdomen - soft, nontender, nondistended, no masses or organomegaly Lymph- no adenopathy palpable Ext-peripheral pulses normal, no pedal edema, no clubbing or cyanosis Skin-Warm and dry. no hyperpigmentation, vitiligo, or suspicious lesions Neuro -alert, oriented, normal speech, no focal findings or movement disorder noted Assessment/Plan: 
Diagnoses and all orders for this visit: 
 
Depression, unspecified depression type Essential hypertension Idiopathic hypersomnia Crohn's disease without complication, unspecified gastrointestinal tract location [facility]) Obesity (BMI 30-39. 9) Other instructions: The patient's medications are reviewed and reconciled. No change in her current medical regimen is made. Body mass index is 39.5 and dietary counseling along with printed patient education is given Labs from July 2nd were reviewed with the patient today. Follow-up in 6 months Follow-up Disposition: 
Return in about 6 months (around 7/17/2019). I have reviewed with the patient details of the assessment and plan and all questions were answered. Relevent patient education was performed. The most recent lab findings were reviewed with the patient. An After Visit Summary was printed and given to the patient.  
 
Rocío Martínez MD

## 2019-01-17 NOTE — PATIENT INSTRUCTIONS

## 2019-02-13 DIAGNOSIS — G47.11 IDIOPATHIC HYPERSOMNIA: ICD-10-CM

## 2019-02-13 RX ORDER — METHYLPHENIDATE HYDROCHLORIDE 20 MG/1
20 TABLET ORAL 2 TIMES DAILY
Qty: 60 TAB | Refills: 0 | Status: SHIPPED | OUTPATIENT
Start: 2019-02-13 | End: 2019-03-28 | Stop reason: SDUPTHER

## 2019-02-13 NOTE — TELEPHONE ENCOUNTER
Last Refill: 1/7/2019  Last visit:1/17/2019      Requested Prescriptions     Pending Prescriptions Disp Refills    methylphenidate HCl (RITALIN) 20 mg tablet 60 Tab 0     Sig: Take 1 Tab (20 mg total) by mouth two (2) times a dayEarliest Fill Date: 2/13/19.   Max Daily Amount: 40 mg

## 2019-03-19 RX ORDER — HYDROCHLOROTHIAZIDE 25 MG/1
25 TABLET ORAL DAILY
Qty: 90 TAB | Refills: 3 | Status: SHIPPED | OUTPATIENT
Start: 2019-03-19 | End: 2020-03-30 | Stop reason: SDUPTHER

## 2019-03-19 NOTE — TELEPHONE ENCOUNTER
Pharmacy on file verified  Requested Prescriptions     Pending Prescriptions Disp Refills    hydroCHLOROthiazide (HYDRODIURIL) 25 mg tablet 90 Tab 3     Sig: Take 1 Tab by mouth daily.      LOV 1/17/19  NOV 7/24/2019  LF 3/5/2018

## 2019-03-28 DIAGNOSIS — G47.11 IDIOPATHIC HYPERSOMNIA: ICD-10-CM

## 2019-03-28 RX ORDER — METHYLPHENIDATE HYDROCHLORIDE 20 MG/1
20 TABLET ORAL 2 TIMES DAILY
Qty: 60 TAB | Refills: 0 | Status: SHIPPED | OUTPATIENT
Start: 2019-03-28 | End: 2019-05-06 | Stop reason: SDUPTHER

## 2019-03-28 NOTE — TELEPHONE ENCOUNTER
Last Refill: 2/13/2019  Last visit:1/17/2019      Requested Prescriptions     Pending Prescriptions Disp Refills    methylphenidate HCl (RITALIN) 20 mg tablet 60 Tab 0     Sig: Take 1 Tab by mouth two (2) times a day.  Max Daily Amount: 40 mg.       Mail Rx to patient

## 2019-04-05 RX ORDER — CITALOPRAM 20 MG/1
20 TABLET, FILM COATED ORAL DAILY
Qty: 90 TAB | Refills: 3 | Status: SHIPPED | OUTPATIENT
Start: 2019-04-05 | End: 2021-01-04 | Stop reason: SDUPTHER

## 2019-04-05 NOTE — TELEPHONE ENCOUNTER
Last Refill: 2/20/2018  Last visit:1/17/2019    Requested Prescriptions     Pending Prescriptions Disp Refills    citalopram (CELEXA) 20 mg tablet 90 Tab 3     Sig: Take 1 Tab by mouth daily.

## 2019-05-06 DIAGNOSIS — G47.11 IDIOPATHIC HYPERSOMNIA: ICD-10-CM

## 2019-05-06 RX ORDER — METHYLPHENIDATE HYDROCHLORIDE 20 MG/1
20 TABLET ORAL 2 TIMES DAILY
Qty: 60 TAB | Refills: 0 | Status: SHIPPED | OUTPATIENT
Start: 2019-05-06 | End: 2019-06-20 | Stop reason: SDUPTHER

## 2019-05-06 NOTE — TELEPHONE ENCOUNTER
Last Refill: 3/28/2019  Last visit:1/17/2019    Requested Prescriptions     Pending Prescriptions Disp Refills    methylphenidate HCl (RITALIN) 20 mg tablet 60 Tab 0     Sig: Take 1 Tab by mouth two (2) times a day. Max Daily Amount: 40 mg.

## 2019-06-20 DIAGNOSIS — G47.11 IDIOPATHIC HYPERSOMNIA: ICD-10-CM

## 2019-06-20 RX ORDER — METHYLPHENIDATE HYDROCHLORIDE 20 MG/1
20 TABLET ORAL 2 TIMES DAILY
Qty: 60 TAB | Refills: 0 | Status: SHIPPED | OUTPATIENT
Start: 2019-06-20 | End: 2019-07-24 | Stop reason: SDUPTHER

## 2019-06-20 NOTE — TELEPHONE ENCOUNTER
Last Refill: 5/6/2019  Last visit:1/17/2019  NOV: 7/24/2019      Requested Prescriptions     Pending Prescriptions Disp Refills    methylphenidate HCl (RITALIN) 20 mg tablet 60 Tab 0     Sig: Take 1 Tab by mouth two (2) times a day.  Max Daily Amount: 40 mg.       mail Rx to patient

## 2019-07-24 ENCOUNTER — OFFICE VISIT (OUTPATIENT)
Dept: INTERNAL MEDICINE CLINIC | Age: 54
End: 2019-07-24

## 2019-07-24 VITALS
TEMPERATURE: 98.4 F | OXYGEN SATURATION: 97 % | RESPIRATION RATE: 22 BRPM | HEART RATE: 92 BPM | DIASTOLIC BLOOD PRESSURE: 80 MMHG | SYSTOLIC BLOOD PRESSURE: 118 MMHG | WEIGHT: 244.4 LBS | HEIGHT: 66 IN | BODY MASS INDEX: 39.28 KG/M2

## 2019-07-24 DIAGNOSIS — I10 ESSENTIAL HYPERTENSION: Primary | Chronic | ICD-10-CM

## 2019-07-24 DIAGNOSIS — K50.90 CROHN'S DISEASE WITHOUT COMPLICATION, UNSPECIFIED GASTROINTESTINAL TRACT LOCATION (HCC): ICD-10-CM

## 2019-07-24 DIAGNOSIS — G47.11 IDIOPATHIC HYPERSOMNIA: ICD-10-CM

## 2019-07-24 DIAGNOSIS — F32.A DEPRESSION, UNSPECIFIED DEPRESSION TYPE: ICD-10-CM

## 2019-07-24 DIAGNOSIS — E66.9 OBESITY (BMI 30-39.9): ICD-10-CM

## 2019-07-24 PROBLEM — E66.01 OBESITY, MORBID (HCC): Status: RESOLVED | Noted: 2018-01-02 | Resolved: 2019-07-24

## 2019-07-24 RX ORDER — METHYLPHENIDATE HYDROCHLORIDE 20 MG/1
20 TABLET ORAL 2 TIMES DAILY
Qty: 60 TAB | Refills: 0 | Status: SHIPPED | OUTPATIENT
Start: 2019-07-24 | End: 2019-08-29 | Stop reason: SDUPTHER

## 2019-07-24 NOTE — PATIENT INSTRUCTIONS

## 2019-07-24 NOTE — PROGRESS NOTES
This note will not be viewable in [address] Gumaro Tyson is a 53 y.o. female and presents with Hypertension (6 mo fu)  . Subjective:  Mrs. Vargas Chong presents to the office today in follow-up of multiple medical problems. The patient has hypertension and remains on hydrochlorothiazide. She is tolerating this without muscle cramping, lower extremity edema or orthostatic dizziness. She has had no headaches, numbness, tingling or logical problems. She has depression which is been managed on Celexa 20 mg a day. The medication continues to work effectively for her controlling her depression without any exacerbations. She has had no long-term side effects related to the medication that she has been taking. She has idiopathic hypersomnia for which she has been placed on Ritalin 20 mg twice a day. The medication continues to work effectively for her and maintaining her alertness and allowing her to work and perform activities of daily living without impairment. She has had no tolerance to the medication over time or long-term side effects related to its usage. She has Crohn's disease and continues to be followed by Dr. Chantelle Coleman for this on a yearly basis. Her last colonoscopy was 2 years ago and will not be repeated for another year. She denies any exacerbations of her Crohn's. She has had no abdominal pain diarrhea or blood in her stools. Past Medical History:   Diagnosis Date    Allergic rhinitis 12/20/2017    Crohn's disease ([address] 12/20/2017    Depression 12/20/2017    Essential hypertension 12/20/2017    Idiopathic hypersomnia 12/20/2017    Obesity (BMI 30-39.9) 12/20/2017    Shift work sleep disorder 12/20/2017     Past Surgical History:   Procedure Laterality Date    HX ORTHOPAEDIC       No Known Allergies  Current Outpatient Medications   Medication Sig Dispense Refill    methylphenidate HCl (RITALIN) 20 mg tablet Take 1 Tab by mouth two (2) times a day.  Max Daily Amount: 40 mg. 60 Tab 0    citalopram (CELEXA) 20 mg tablet Take 1 Tab by mouth daily. 90 Tab 3    hydroCHLOROthiazide (HYDRODIURIL) 25 mg tablet Take 1 Tab by mouth daily. 90 Tab 3    mesalamine (PENTASA) 500 mg CR capsule Take 500 mg by mouth four (4) times daily.        Social History     Socioeconomic History    Marital status:      Spouse name: Not on file    Number of children: Not on file    Years of education: Not on file    Highest education level: Not on file   Tobacco Use    Smoking status: Never Smoker    Smokeless tobacco: Never Used     Family History   Problem Relation Age of Onset    Hypertension Mother     Diabetes Mother     Diabetes Father     Cancer Father        Health Maintenance   Topic Date Due    DTaP/Tdap/Td series (1 - Tdap) 02/18/1985    PAP AKA CERVICAL CYTOLOGY  02/18/1985    Influenza Age 5 to Adult  08/01/2019    BREAST CANCER SCRN MAMMOGRAM  07/09/2020    COLONOSCOPY  08/28/2020    Hepatitis C Screening  Completed    Shingrix Vaccine Age 50>  Completed    Pneumococcal 0-64 years  Aged Out        Review of Systems  Constitutional: negative for fevers, chills, anorexia and weight loss  Eyes:   negative for visual disturbance and irritation  ENT:   negative for tinnitus,sore throat,nasal congestion,ear pain,hoarseness  Respiratory:  negative for cough, hemoptysis, dyspnea,wheezing  CV:   negative for chest pain, palpitations, lower extremity edema  GI:   negative for nausea, vomiting, diarrhea, abdominal pain,melena  Endo:               negative for polyuria,polydipsia,polyphagia,heat intolerance  Genitourinary: negative for frequency, dysuria and hematuria  Integumentary: negative for rash and pruritus  Hematologic:  negative for easy bruising and gum/nose bleeding  Musculoskel: negative for myalgias, arthralgias, back pain, muscle weakness, joint pain  Neurological:  negative for headaches, dizziness, vertigo, memory problems and gait   Behavl/Psych: negative for feelings of anxiety, depression, mood changes  ROS otherwise negative      Objective:  Visit Vitals  /80 (BP 1 Location: Right arm, BP Patient Position: Sitting)   Pulse 92   Temp 98.4 °F (36.9 °C) (Oral)   Resp 22   Ht 5' 6\" (1.676 m)   Wt 244 lb 6.4 oz (110.9 kg)   SpO2 97%   BMI 39.45 kg/m²     Body mass index is 39.45 kg/m². Physical Exam:   General appearance - alert, well appearing, and in no distress  Mental status - alert, oriented to person, place, and time  EYE-ANA MARIA, EOMI,conjunctiva normal bilaterally, lids normal  ENT-ENT exam normal, no neck nodes or sinus tenderness  Nose - normal and patent, no erythema,  Or discharge   Mouth - mucous membranes moist, pharynx normal without lesions  Neck - supple, no significant adenopathy or bruit  Chest - clear to auscultation, no wheezes, rales or rhonchi. Heart - normal rate, regular rhythm, normal S1, S2, no murmurs, rubs, clicks or gallops   Abdomen - soft, nontender, nondistended, no masses or organomegaly  Lymph- no adenopathy palpable  Ext-peripheral pulses normal, no pedal edema, no clubbing or cyanosis  Skin-Warm and dry. no hyperpigmentation, vitiligo, or suspicious lesions  Neuro -alert, oriented, normal speech, no focal findings or movement disorder noted      Assessment/Plan:  Diagnoses and all orders for this visit:    Essential hypertension  -     CBC WITH AUTOMATED DIFF  -     METABOLIC PANEL, COMPREHENSIVE  -     LIPID PANEL  -     URINALYSIS W/ RFLX MICROSCOPIC  -     TSH 3RD GENERATION    Depression, unspecified depression type    Idiopathic hypersomnia  -     methylphenidate HCl (RITALIN) 20 mg tablet; Take 1 Tab by mouth two (2) times a day. Max Daily Amount: 40 mg., Print, Disp-60 Tab, R-0    Crohn's disease without complication, unspecified gastrointestinal tract location (HCC)    Obesity (BMI 30-39. 9)        Other instructions: The patient's medications were reviewed and reconciled.   No change in her current medical regimen is made.    Body mass index is 39.5 and dietary counseling along with printed patient education is given    She continues to do well in regards to her treatment for depression on the Celexa that she is currently prescribed. No dosage adjustment will be made at this time. Await results of multiple labs    Follow-up 6 months    Follow-up and Dispositions    · Return in about 6 months (around 1/24/2020). I have reviewed with the patient details of the assessment and plan and all questions were answered. Relevent patient education was performed. The most recent lab findings were reviewed with the patient. An After Visit Summary was printed and given to the patient.     Candy Sky MD

## 2019-07-24 NOTE — PROGRESS NOTES
Francoise Yao is a 53 y.o. female presenting for Hypertension (6 mo fu)  . 1. Have you been to the ER, urgent care clinic since your last visit? Hospitalized since your last visit? No    2. Have you seen or consulted any other health care providers outside of the [address] [place] [address] since your last visit? Include any pap smears or colon screening. No    No flowsheet data found. No flowsheet data found. 3 most recent PHQ Screens 7/24/2019   Little interest or pleasure in doing things Not at all   Feeling down, depressed, irritable, or hopeless Not at all   Total Score PHQ 2 0   Trouble falling or staying asleep, or sleeping too much Not at all   Feeling tired or having little energy Nearly every day   Poor appetite, weight loss, or overeating Not at all   Feeling bad about yourself - or that you are a failure or have let yourself or your family down Not at all   Trouble concentrating on things such as school, work, reading, or watching TV Not at all   Moving or speaking so slowly that other people could have noticed; or the opposite being so fidgety that others notice Not at all   Thoughts of being better off dead, or hurting yourself in some way Not at all   PHQ 9 Score 3   How difficult have these problems made it for you to do your work, take care of your home and get along with others Not difficult at all       There are no discontinued medications.

## 2019-07-25 LAB
ALBUMIN SERPL-MCNC: 4.3 G/DL (ref 3.5–5.5)
ALBUMIN/GLOB SERPL: 1.7 {RATIO} (ref 1.2–2.2)
ALP SERPL-CCNC: 118 IU/L (ref 39–117)
ALT SERPL-CCNC: 15 IU/L (ref 0–32)
APPEARANCE UR: CLEAR
AST SERPL-CCNC: 14 IU/L (ref 0–40)
BASOPHILS # BLD AUTO: 0 X10E3/UL (ref 0–0.2)
BASOPHILS NFR BLD AUTO: 0 %
BILIRUB SERPL-MCNC: 0.3 MG/DL (ref 0–1.2)
BILIRUB UR QL STRIP: NEGATIVE
BUN SERPL-MCNC: 13 MG/DL (ref 6–24)
BUN/CREAT SERPL: 16 (ref 9–23)
CALCIUM SERPL-MCNC: 9.6 MG/DL (ref 8.7–10.2)
CHLORIDE SERPL-SCNC: 105 MMOL/L (ref 96–106)
CHOLEST SERPL-MCNC: 163 MG/DL (ref 100–199)
CO2 SERPL-SCNC: 27 MMOL/L (ref 20–29)
COLOR UR: YELLOW
CREAT SERPL-MCNC: 0.81 MG/DL (ref 0.57–1)
EOSINOPHIL # BLD AUTO: 0.5 X10E3/UL (ref 0–0.4)
EOSINOPHIL NFR BLD AUTO: 6 %
ERYTHROCYTE [DISTWIDTH] IN BLOOD BY AUTOMATED COUNT: 12.4 % (ref 12.3–15.4)
GLOBULIN SER CALC-MCNC: 2.6 G/DL (ref 1.5–4.5)
GLUCOSE SERPL-MCNC: 104 MG/DL (ref 65–99)
GLUCOSE UR QL: NEGATIVE
HCT VFR BLD AUTO: 43 % (ref 34–46.6)
HDLC SERPL-MCNC: 55 MG/DL
HGB BLD-MCNC: 14.5 G/DL (ref 11.1–15.9)
HGB UR QL STRIP: NEGATIVE
IMM GRANULOCYTES # BLD AUTO: 0 X10E3/UL (ref 0–0.1)
IMM GRANULOCYTES NFR BLD AUTO: 1 %
KETONES UR QL STRIP: NEGATIVE
LDLC SERPL CALC-MCNC: 92 MG/DL (ref 0–99)
LEUKOCYTE ESTERASE UR QL STRIP: NEGATIVE
LYMPHOCYTES # BLD AUTO: 2 X10E3/UL (ref 0.7–3.1)
LYMPHOCYTES NFR BLD AUTO: 25 %
MCH RBC QN AUTO: 31.4 PG (ref 26.6–33)
MCHC RBC AUTO-ENTMCNC: 33.7 G/DL (ref 31.5–35.7)
MCV RBC AUTO: 93 FL (ref 79–97)
MICRO URNS: NORMAL
MONOCYTES # BLD AUTO: 0.4 X10E3/UL (ref 0.1–0.9)
MONOCYTES NFR BLD AUTO: 5 %
NEUTROPHILS # BLD AUTO: 5 X10E3/UL (ref 1.4–7)
NEUTROPHILS NFR BLD AUTO: 63 %
NITRITE UR QL STRIP: NEGATIVE
PH UR STRIP: 7.5 [PH] (ref 5–7.5)
PLATELET # BLD AUTO: 252 X10E3/UL (ref 150–450)
POTASSIUM SERPL-SCNC: 4.3 MMOL/L (ref 3.5–5.2)
PROT SERPL-MCNC: 6.9 G/DL (ref 6–8.5)
PROT UR QL STRIP: NEGATIVE
RBC # BLD AUTO: 4.62 X10E6/UL (ref 3.77–5.28)
SODIUM SERPL-SCNC: 147 MMOL/L (ref 134–144)
SP GR UR: 1.02 (ref 1–1.03)
TRIGL SERPL-MCNC: 80 MG/DL (ref 0–149)
TSH SERPL DL<=0.005 MIU/L-ACNC: 1.55 UIU/ML (ref 0.45–4.5)
UROBILINOGEN UR STRIP-MCNC: 0.2 MG/DL (ref 0.2–1)
VLDLC SERPL CALC-MCNC: 16 MG/DL (ref 5–40)
WBC # BLD AUTO: 8 X10E3/UL (ref 3.4–10.8)

## 2019-08-05 ENCOUNTER — PATIENT MESSAGE (OUTPATIENT)
Dept: INTERNAL MEDICINE CLINIC | Age: 54
End: 2019-08-05

## 2019-08-29 DIAGNOSIS — G47.11 IDIOPATHIC HYPERSOMNIA: ICD-10-CM

## 2019-08-29 RX ORDER — METHYLPHENIDATE HYDROCHLORIDE 20 MG/1
20 TABLET ORAL 2 TIMES DAILY
Qty: 60 TAB | Refills: 0 | Status: SHIPPED | OUTPATIENT
Start: 2019-08-29 | End: 2019-10-14 | Stop reason: SDUPTHER

## 2019-08-29 NOTE — TELEPHONE ENCOUNTER
Last Refill: 7/24/19  Last visit:7/24/2019      Requested Prescriptions     Pending Prescriptions Disp Refills    methylphenidate HCl (RITALIN) 20 mg tablet 60 Tab 0     Sig: Take 1 Tab by mouth two (2) times a day. Max Daily Amount: 40 mg.

## 2019-10-14 DIAGNOSIS — G47.11 IDIOPATHIC HYPERSOMNIA: ICD-10-CM

## 2019-10-14 RX ORDER — METHYLPHENIDATE HYDROCHLORIDE 20 MG/1
20 TABLET ORAL 2 TIMES DAILY
Qty: 60 TAB | Refills: 0 | Status: SHIPPED | OUTPATIENT
Start: 2019-10-14 | End: 2019-11-21 | Stop reason: SDUPTHER

## 2019-10-14 NOTE — TELEPHONE ENCOUNTER
Last Refill: 8/29/19  Last visit:7/24/2019  NOV: 2/12/2020      Requested Prescriptions     Pending Prescriptions Disp Refills    methylphenidate HCl (RITALIN) 20 mg tablet 60 Tab 0     Sig: Take 1 Tab by mouth two (2) times a day. Max Daily Amount: 40 mg.

## 2019-11-21 DIAGNOSIS — G47.11 IDIOPATHIC HYPERSOMNIA: ICD-10-CM

## 2019-11-21 RX ORDER — METHYLPHENIDATE HYDROCHLORIDE 20 MG/1
20 TABLET ORAL 2 TIMES DAILY
Qty: 60 TAB | Refills: 0 | Status: SHIPPED | OUTPATIENT
Start: 2019-11-21 | End: 2020-01-05 | Stop reason: SDUPTHER

## 2019-11-21 NOTE — TELEPHONE ENCOUNTER
Last Refill: 10/14/19  Last visit:7/24/2019  NOV: 2/12/2020      Requested Prescriptions     Pending Prescriptions Disp Refills    methylphenidate HCl (RITALIN) 20 mg tablet 60 Tab 0     Sig: Take 1 Tab by mouth two (2) times a day. Max Daily Amount: 40 mg.

## 2019-12-03 ENCOUNTER — TELEPHONE (OUTPATIENT)
Dept: INTERNAL MEDICINE CLINIC | Age: 54
End: 2019-12-03

## 2019-12-03 NOTE — TELEPHONE ENCOUNTER
----- Message from Medardo Maya MD sent at 12/3/2019  7:14 AM EST -----  Regarding: FW: Non-Urgent Medical Question  Contact: [phone]    ----- Message -----  From: Gitasarah JianMERARI  Sent: [id number]/0/5977   3:39 PM EST  To: Medardo Maya MD  Subject: FW: Non-Urgent Medical Question                    ----- Message -----  From: Sofia Gonzalez  Sent: 12/2/2019   3:31 PM EST  To: [facility]  Subject: RE: Non-Urgent Medical Question                  May I get an appointment for tomorrow afternoon or Wednesday afternoon? ThanksElvira     ----- Message -----  From: Medardo Maya MD  Sent: 12/2/19 15:24  To: Sofia Gonzalez  Subject: RE: Non-Urgent Medical Question    You probably will need to make an appointment in order to discuss this such that we can document this in your medical record. All this would need to be outlined in the record such that we may be able to request restrictions on your work hours      ----- Message -----     From: Sofia Gonzalez     Sent: 12/2/2019  1:44 PM EST       To: Medardo Maya MD  Subject: RE: Non-Urgent Medical Question    Thank you. I feel like it is more stress related than Thanksgiving, but I will continue to work to getting it under control by making better choices. Speaking of stress, I have just been advised that I will be required to stay at work until 2200 hrs tonight. This is becoming a frequent issue in my workplace, and frankly, I am struggling. My assigned shift is 8 hours and while I am often exhausted at the end of the work day, it is manageable. I am regularly required to stay until 2000 hrs (12 hour shift), and at the end of that shift I feel somewhat \"intoxicated,\" for want of a better word. My ability to process/respond seems impaired, and I really don't feel like I should be driving. The next day, I am even more tired than my normal baseline and tired and irritable.      Is it possible to get a note for work that due to my underlying medical condition, which I have been treated for since 2004, I cannot work more than 14 hours (ideally no more than 12), and that consecutive days of shifts exceeding 10-12 hours should be avoided? I completely understand if an office visit is required for this. If so, is it possible to get an appointment tomorrow afternoon or Wednesday afternoon? I have been above board with my employer regarding my medical issue. Thank you,   Milena Cooper     ----- Message -----  From: Donnita Goldmann, MD  Sent: 12/2/19 09:48  To: Fred Patel  Subject: RE: Non-Urgent Medical Question    I think the exercise will help. Gernerpat Thanksangelika food has a lot of salt in it and I would recommend that she just try to restrict it a little bit better. Your blood pressure came down and that is a good sign that probably is not an issue. I would just keep monitoring it for the time being. Roxann Pass ----- Message -----     From: Fred Patel     Sent: 11/29/2019  8:13 PM EST       To: Donnita Goldmann, MD  Subject: RE: Non-Urgent Medical Question    Good Morning,    On 11/13 I was told at my dentist's appointment that my BP was elevated (140-something over 80-something at 1030 in the morning). I checked it at the pharmacy 11/22 (143/87 at 1445 hrs), today at the pharmacy (142/92 at 1630 hrs), and again today a couple hours later with my monitor at home (123/80 at 1915 hrs). I don't think this warrants an office visit, but wanted to touch base to verify, and ask if there's anything I should do differently (increase HCTZ to pill and a half?). I resolved after the Great Thanksgiving Day Carb Debacle to start walking regularly and to try to make better food choices.      Thanks,   Milena Cooper

## 2019-12-17 ENCOUNTER — OFFICE VISIT (OUTPATIENT)
Dept: INTERNAL MEDICINE CLINIC | Age: 54
End: 2019-12-17

## 2019-12-17 VITALS
TEMPERATURE: 98.7 F | DIASTOLIC BLOOD PRESSURE: 76 MMHG | WEIGHT: 249 LBS | SYSTOLIC BLOOD PRESSURE: 126 MMHG | RESPIRATION RATE: 20 BRPM | HEART RATE: 118 BPM | OXYGEN SATURATION: 95 % | HEIGHT: 66 IN | BODY MASS INDEX: 40.02 KG/M2

## 2019-12-17 DIAGNOSIS — E66.9 OBESITY (BMI 30-39.9): ICD-10-CM

## 2019-12-17 DIAGNOSIS — G47.11 IDIOPATHIC HYPERSOMNIA: Primary | ICD-10-CM

## 2019-12-17 PROBLEM — G47.26 SHIFT WORK SLEEP DISORDER: Status: RESOLVED | Noted: 2017-12-20 | Resolved: 2019-12-17

## 2019-12-17 NOTE — PATIENT INSTRUCTIONS

## 2019-12-17 NOTE — PROGRESS NOTES
This note will not be viewable in [address] Subjective:     Ms. Nannette Pollard is seen in follow-up of her idiopathic hypersomnia. Patient's history is remarkable in that this is been ongoing since before 2004 and she was evaluated by Dr. Katherine Eubanks at [facility] The patient has been treated with Ritalin which has been working well for her however she is now finding that it is becoming harder to work because of the amount of hours that she is required to work as a . The patient finds when she works long shifts and shifts that have short recovery times between them that she is excessively tired and fatigued and feels impaired in her ability to do such things as driving and making quick decisions as she is required to do for her job. She would like to have a letter restricting her shifts, lengths of time etc. because of her idiopathic hypersomnia and need to get rest.  She has had no accidents or problems with her job to date but she is fearful that it could develop over time if something is not done about the amount of shift time she is working. Past Medical History:   Diagnosis Date    Allergic rhinitis 12/20/2017    Crohn's disease ([facility] [address] 12/20/2017    Depression 12/20/2017    Essential hypertension 12/20/2017    Idiopathic hypersomnia 12/20/2017    Obesity (BMI 30-39.9) 12/20/2017    Shift work sleep disorder 12/20/2017     Past Surgical History:   Procedure Laterality Date    HX ORTHOPAEDIC       No Known Allergies  Current Outpatient Medications   Medication Sig Dispense Refill    methylphenidate HCl (RITALIN) 20 mg tablet Take 1 Tab by mouth two (2) times a day. Max Daily Amount: 40 mg. 60 Tab 0    citalopram (CELEXA) 20 mg tablet Take 1 Tab by mouth daily. 90 Tab 3    hydroCHLOROthiazide (HYDRODIURIL) 25 mg tablet Take 1 Tab by mouth daily. 90 Tab 3    mesalamine (PENTASA) 500 mg CR capsule Take 500 mg by mouth three (3) times daily.        Social History Socioeconomic History    Marital status:      Spouse name: Not on file    Number of children: Not on file    Years of education: Not on file    Highest education level: Not on file   Tobacco Use    Smoking status: Never Smoker    Smokeless tobacco: Never Used     Family History   Problem Relation Age of Onset    Hypertension Mother     Diabetes Mother     Diabetes Father     Cancer Father        Review of Systems:  GEN: no weight loss, weight gain. Positive for fatigue  CV: no PND, orthopnea, or palpitations  Resp: no dyspnea on exertion, no cough  Abd: no nausea, vomiting or diarrhea  EXT: denies edema, claudication  Endocrine: no hair loss, excessive thirst or polyuria  Neurological ROS: no TIA or stroke symptoms  ROS otherwise negative      Objective:     Visit Vitals  /76 (BP 1 Location: Left arm, BP Patient Position: Sitting)   Pulse (!) 118   Temp 98.7 °F (37.1 °C) (Oral)   Resp 20   Ht 5' 6\" (1.676 m)   Wt 249 lb (112.9 kg)   SpO2 95%   BMI 40.19 kg/m²     Body mass index is 40.19 kg/m². General:   alert, cooperative and no distress   Eyes: conjunctivae/sclerae clear. PERRL, EOM's intact   Mouth:  No oral lesions, no pharyngeal erythema, no exudates   Neck: Trachea midline, no thyromegaly, no bruits   Heart: S1 and S2 normal,no murmurs noted    Lungs: Clear to auscultation bilaterally, no increased work of breathing   Abdomen: Soft, nontender. Normal bowel sounds   Extremities: No edema or cyanosis   Neuro: ..alert, oriented x3,speech normal in context and clarity, cranial nerves II-XII intact,motor strength: full proximally and distally,gait: normal  reflexes: full and symmetric     Physical exam otherwise negative         Assessment/Plan:     Diagnoses and all orders for this visit:    Idiopathic hypersomnia    Obesity (BMI 30-39. 9)        Other instructions: The patient's medications were reviewed and reconciled.   No change in her current medical regimen will be made.    We will produce a letter for her to take to her employer in order to restrict her time at work such that she does not push the envelope in regards to the amount of time she is working and making her idiopathic hypersomnia worse. Follow-up here in 6 months time    Follow-up and Dispositions    · Return for As previously scheduled.          Kamla Torres MD

## 2019-12-17 NOTE — PROGRESS NOTES
David Sibley is a 53 y.o. female presenting for Letter  . 1. Have you been to the ER, urgent care clinic since your last visit? Hospitalized since your last visit? No    2. Have you seen or consulted any other health care providers outside of the [address] [place] [address] since your last visit? Include any pap smears or colon screening. No    No flowsheet data found. No flowsheet data found. 3 most recent PHQ Screens 7/24/2019   Little interest or pleasure in doing things Not at all   Feeling down, depressed, irritable, or hopeless Not at all   Total Score PHQ 2 0   Trouble falling or staying asleep, or sleeping too much Not at all   Feeling tired or having little energy Nearly every day   Poor appetite, weight loss, or overeating Not at all   Feeling bad about yourself - or that you are a failure or have let yourself or your family down Not at all   Trouble concentrating on things such as school, work, reading, or watching TV Not at all   Moving or speaking so slowly that other people could have noticed; or the opposite being so fidgety that others notice Not at all   Thoughts of being better off dead, or hurting yourself in some way Not at all   PHQ 9 Score 3   How difficult have these problems made it for you to do your work, take care of your home and get along with others Not difficult at all       There are no discontinued medications.

## 2020-01-05 DIAGNOSIS — G47.11 IDIOPATHIC HYPERSOMNIA: ICD-10-CM

## 2020-01-06 RX ORDER — METHYLPHENIDATE HYDROCHLORIDE 20 MG/1
20 TABLET ORAL 2 TIMES DAILY
Qty: 60 TAB | Refills: 0 | Status: SHIPPED | OUTPATIENT
Start: 2020-01-06 | End: 2020-02-14 | Stop reason: SDUPTHER

## 2020-01-06 NOTE — TELEPHONE ENCOUNTER
Last Refill: 11/21/19  Last visit:12/17/2019    NOV: 6/17/2020    Requested Prescriptions     Pending Prescriptions Disp Refills    methylphenidate HCl (RITALIN) 20 mg tablet 60 Tab 0     Sig: Take 1 Tab by mouth two (2) times a day. Max Daily Amount: 40 mg.

## 2020-02-14 DIAGNOSIS — G47.11 IDIOPATHIC HYPERSOMNIA: ICD-10-CM

## 2020-02-14 RX ORDER — METHYLPHENIDATE HYDROCHLORIDE 20 MG/1
20 TABLET ORAL 2 TIMES DAILY
Qty: 60 TAB | Refills: 0 | Status: SHIPPED | OUTPATIENT
Start: 2020-02-14 | End: 2020-03-26 | Stop reason: SDUPTHER

## 2020-02-14 NOTE — TELEPHONE ENCOUNTER
Last Refill: 1/6/20  Last visit:12/17/2019    NOV: 6/17/2020    Requested Prescriptions     Pending Prescriptions Disp Refills    methylphenidate HCl (RITALIN) 20 mg tablet 60 Tab 0     Sig: Take 1 Tab by mouth two (2) times a day. Max Daily Amount: 40 mg.

## 2020-03-26 ENCOUNTER — PATIENT MESSAGE (OUTPATIENT)
Dept: INTERNAL MEDICINE CLINIC | Age: 55
End: 2020-03-26

## 2020-03-26 DIAGNOSIS — G47.11 IDIOPATHIC HYPERSOMNIA: ICD-10-CM

## 2020-03-26 RX ORDER — METHYLPHENIDATE HYDROCHLORIDE 20 MG/1
20 TABLET ORAL 2 TIMES DAILY
Qty: 60 TAB | Refills: 0 | Status: SHIPPED | OUTPATIENT
Start: 2020-03-26 | End: 2020-05-11 | Stop reason: SDUPTHER

## 2020-03-26 NOTE — TELEPHONE ENCOUNTER
From: Rod John  To: Jose Martin MD  Sent: 3/26/2020 8:58 AM EDT  Subject: Prescription Question    Can you please send a prescription for methylphenidate 20 mg to [facility] on [id number] [place] [address] [address]? Thank you for all you do!     Tia Grey

## 2020-03-30 RX ORDER — HYDROCHLOROTHIAZIDE 25 MG/1
25 TABLET ORAL DAILY
Qty: 90 TAB | Refills: 3 | Status: SHIPPED | OUTPATIENT
Start: 2020-03-30 | End: 2021-06-14 | Stop reason: SDUPTHER

## 2020-03-30 NOTE — TELEPHONE ENCOUNTER
Requested Prescriptions     Pending Prescriptions Disp Refills    hydroCHLOROthiazide (HYDRODIURIL) 25 mg tablet 90 Tab 3     Sig: Take 1 Tab by mouth daily.        Last Refill: 3-19-19  Last visit:12-17-19

## 2020-03-30 NOTE — TELEPHONE ENCOUNTER
Requested Prescriptions     Pending Prescriptions Disp Refills    hydroCHLOROthiazide (HYDRODIURIL) 25 mg tablet 90 Tab 3     Sig: Take 1 Tab by mouth daily.

## 2020-04-01 ENCOUNTER — TELEPHONE (OUTPATIENT)
Dept: INTERNAL MEDICINE CLINIC | Age: 55
End: 2020-04-01

## 2020-04-01 ENCOUNTER — PATIENT MESSAGE (OUTPATIENT)
Dept: INTERNAL MEDICINE CLINIC | Age: 55
End: 2020-04-01

## 2020-04-01 NOTE — TELEPHONE ENCOUNTER
[facility] is requesting a verbal prior authorization mesalamine (PENTASA) 500 mg CR capsule.  Request a callback [phone]

## 2020-05-11 ENCOUNTER — PATIENT MESSAGE (OUTPATIENT)
Dept: INTERNAL MEDICINE CLINIC | Age: 55
End: 2020-05-11

## 2020-05-11 DIAGNOSIS — G47.11 IDIOPATHIC HYPERSOMNIA: ICD-10-CM

## 2020-05-11 RX ORDER — METHYLPHENIDATE HYDROCHLORIDE 20 MG/1
20 TABLET ORAL 2 TIMES DAILY
Qty: 60 TAB | Refills: 0 | Status: SHIPPED | OUTPATIENT
Start: 2020-05-11 | End: 2020-06-17 | Stop reason: SDUPTHER

## 2020-05-11 NOTE — TELEPHONE ENCOUNTER
From: Mejia Jerez  To: Ofelia Silva MD  Sent: 5/11/2020 10:31 AM EDT  Subject: Prescription Question    Good morning,    Can you please send a prescription for methylphenidate 20 mg tab to the [facility]? Thank you, and I hope you're all staying healthy.     Eric Nation

## 2020-05-27 DIAGNOSIS — G47.11 IDIOPATHIC HYPERSOMNIA: ICD-10-CM

## 2020-05-27 DIAGNOSIS — I10 ESSENTIAL HYPERTENSION: Primary | Chronic | ICD-10-CM

## 2020-06-08 ENCOUNTER — APPOINTMENT (OUTPATIENT)
Dept: INTERNAL MEDICINE CLINIC | Age: 55
End: 2020-06-08

## 2020-06-08 DIAGNOSIS — G47.11 IDIOPATHIC HYPERSOMNIA: ICD-10-CM

## 2020-06-08 DIAGNOSIS — I10 ESSENTIAL HYPERTENSION: Chronic | ICD-10-CM

## 2020-06-09 LAB
ALBUMIN SERPL-MCNC: 4.2 G/DL (ref 3.8–4.9)
ALBUMIN/GLOB SERPL: 1.8 {RATIO} (ref 1.2–2.2)
ALP SERPL-CCNC: 119 IU/L (ref 39–117)
ALT SERPL-CCNC: 18 IU/L (ref 0–32)
APPEARANCE UR: ABNORMAL
AST SERPL-CCNC: 16 IU/L (ref 0–40)
BACTERIA #/AREA URNS HPF: ABNORMAL /[HPF]
BASOPHILS # BLD AUTO: 0 X10E3/UL (ref 0–0.2)
BASOPHILS NFR BLD AUTO: 0 %
BILIRUB SERPL-MCNC: 0.3 MG/DL (ref 0–1.2)
BILIRUB UR QL STRIP: NEGATIVE
BUN SERPL-MCNC: 17 MG/DL (ref 6–24)
BUN/CREAT SERPL: 22 (ref 9–23)
CALCIUM SERPL-MCNC: 9.4 MG/DL (ref 8.7–10.2)
CASTS URNS QL MICRO: ABNORMAL /LPF
CHLORIDE SERPL-SCNC: 101 MMOL/L (ref 96–106)
CHOLEST SERPL-MCNC: 159 MG/DL (ref 100–199)
CO2 SERPL-SCNC: 23 MMOL/L (ref 20–29)
COLOR UR: YELLOW
CREAT SERPL-MCNC: 0.79 MG/DL (ref 0.57–1)
EOSINOPHIL # BLD AUTO: 0.4 X10E3/UL (ref 0–0.4)
EOSINOPHIL NFR BLD AUTO: 5 %
EPI CELLS #/AREA URNS HPF: >10 /HPF (ref 0–10)
ERYTHROCYTE [DISTWIDTH] IN BLOOD BY AUTOMATED COUNT: 12.6 % (ref 11.7–15.4)
GLOBULIN SER CALC-MCNC: 2.4 G/DL (ref 1.5–4.5)
GLUCOSE SERPL-MCNC: 123 MG/DL (ref 65–99)
GLUCOSE UR QL: NEGATIVE
HCT VFR BLD AUTO: 42.9 % (ref 34–46.6)
HDLC SERPL-MCNC: 56 MG/DL
HGB BLD-MCNC: 14.6 G/DL (ref 11.1–15.9)
HGB UR QL STRIP: ABNORMAL
IMM GRANULOCYTES # BLD AUTO: 0 X10E3/UL (ref 0–0.1)
IMM GRANULOCYTES NFR BLD AUTO: 0 %
KETONES UR QL STRIP: NEGATIVE
LDLC SERPL CALC-MCNC: 87 MG/DL (ref 0–99)
LEUKOCYTE ESTERASE UR QL STRIP: ABNORMAL
LYMPHOCYTES # BLD AUTO: 2.2 X10E3/UL (ref 0.7–3.1)
LYMPHOCYTES NFR BLD AUTO: 30 %
MCH RBC QN AUTO: 31.9 PG (ref 26.6–33)
MCHC RBC AUTO-ENTMCNC: 34 G/DL (ref 31.5–35.7)
MCV RBC AUTO: 94 FL (ref 79–97)
MICRO URNS: ABNORMAL
MONOCYTES # BLD AUTO: 0.4 X10E3/UL (ref 0.1–0.9)
MONOCYTES NFR BLD AUTO: 5 %
MUCOUS THREADS URNS QL MICRO: PRESENT
NEUTROPHILS # BLD AUTO: 4.5 X10E3/UL (ref 1.4–7)
NEUTROPHILS NFR BLD AUTO: 60 %
NITRITE UR QL STRIP: NEGATIVE
PH UR STRIP: 5 [PH] (ref 5–7.5)
PLATELET # BLD AUTO: 227 X10E3/UL (ref 150–450)
POTASSIUM SERPL-SCNC: 4.1 MMOL/L (ref 3.5–5.2)
PROT SERPL-MCNC: 6.6 G/DL (ref 6–8.5)
PROT UR QL STRIP: ABNORMAL
RBC # BLD AUTO: 4.58 X10E6/UL (ref 3.77–5.28)
RBC #/AREA URNS HPF: ABNORMAL /HPF (ref 0–2)
SODIUM SERPL-SCNC: 139 MMOL/L (ref 134–144)
SP GR UR: >=1.03 (ref 1–1.03)
TRIGL SERPL-MCNC: 78 MG/DL (ref 0–149)
TSH SERPL DL<=0.005 MIU/L-ACNC: 3.47 UIU/ML (ref 0.45–4.5)
UROBILINOGEN UR STRIP-MCNC: 0.2 MG/DL (ref 0.2–1)
VLDLC SERPL CALC-MCNC: 16 MG/DL (ref 5–40)
WBC # BLD AUTO: 7.6 X10E3/UL (ref 3.4–10.8)
WBC #/AREA URNS HPF: >30 /HPF (ref 0–5)

## 2020-06-17 ENCOUNTER — OFFICE VISIT (OUTPATIENT)
Dept: INTERNAL MEDICINE CLINIC | Age: 55
End: 2020-06-17

## 2020-06-17 VITALS
TEMPERATURE: 98.6 F | SYSTOLIC BLOOD PRESSURE: 120 MMHG | OXYGEN SATURATION: 96 % | BODY MASS INDEX: 41.01 KG/M2 | HEART RATE: 110 BPM | WEIGHT: 255.2 LBS | HEIGHT: 66 IN | DIASTOLIC BLOOD PRESSURE: 68 MMHG | RESPIRATION RATE: 20 BRPM

## 2020-06-17 DIAGNOSIS — K50.90 CROHN'S DISEASE WITHOUT COMPLICATION, UNSPECIFIED GASTROINTESTINAL TRACT LOCATION (HCC): ICD-10-CM

## 2020-06-17 DIAGNOSIS — F32.A DEPRESSION, UNSPECIFIED DEPRESSION TYPE: ICD-10-CM

## 2020-06-17 DIAGNOSIS — I10 ESSENTIAL HYPERTENSION: Primary | Chronic | ICD-10-CM

## 2020-06-17 DIAGNOSIS — G47.11 IDIOPATHIC HYPERSOMNIA: ICD-10-CM

## 2020-06-17 RX ORDER — METHYLPHENIDATE HYDROCHLORIDE 20 MG/1
20 TABLET ORAL 2 TIMES DAILY
Qty: 60 TAB | Refills: 0 | Status: SHIPPED | OUTPATIENT
Start: 2020-06-17 | End: 2020-07-20 | Stop reason: SDUPTHER

## 2020-06-17 NOTE — PROGRESS NOTES
Vipul Velasco is a 63 y.o. female presenting for Follow Up Chronic Condition (6 mo fu)  . 1. Have you been to the ER, urgent care clinic since your last visit? Hospitalized since your last visit? No    2. Have you seen or consulted any other health care providers outside of the [address] [place] [address] since your last visit? Include any pap smears or colon screening. No    No flowsheet data found. No flowsheet data found. 3 most recent PHQ Screens 6/17/2020   Little interest or pleasure in doing things Not at all   Feeling down, depressed, irritable, or hopeless Not at all   Total Score PHQ 2 0   Trouble falling or staying asleep, or sleeping too much Not at all   Feeling tired or having little energy Not at all   Poor appetite, weight loss, or overeating Not at all   Feeling bad about yourself - or that you are a failure or have let yourself or your family down Not at all   Trouble concentrating on things such as school, work, reading, or watching TV Not at all   Moving or speaking so slowly that other people could have noticed; or the opposite being so fidgety that others notice Not at all   Thoughts of being better off dead, or hurting yourself in some way Not at all   PHQ 9 Score 0   How difficult have these problems made it for you to do your work, take care of your home and get along with others Not difficult at all       There are no discontinued medications.

## 2020-06-17 NOTE — PROGRESS NOTES
This note will not be viewable in [address] Sharlene Gunn is a 63 y.o. female and presents with Follow Up Chronic Condition (6 mo fu)  . Subjective:  Mrs. Justino Carranza presents to the office today in follow-up of multiple medical problems. She has hypertension and is currently on hydrochlorothiazide. She is tolerating this without any orthostatic dizziness or muscle cramping. She denies headaches, numbness, tingling or focal neurological problems. She has Crohn's disease and is followed by Dr. Hua Early for this. She remains on Pentasa. She has had no flareups of symptoms and is not due to see him until his office reopens from the COVID-19 pandemic. Depression has been well controlled on Celexa. She notes no breakthrough symptoms and has had no long-term side effects related to the medication. She is under a stressful job but the medication has been helpful. The patient has idiopathic hypersomnia which is been diagnosed by a sleep  in the past.  She has been chronically on Ritalin 20 mg twice a day for this. It continues to work effectively and keeping her awake. Prior to being on medication in the past she had trouble staying awake and would fall asleep while driving. She is able to work gainfully while taking the medication and has been compliant with her prescriptions and follow-ups. Past Medical History:   Diagnosis Date    Allergic rhinitis 12/20/2017    Crohn's disease ([address] 12/20/2017    Depression 12/20/2017    Essential hypertension 12/20/2017    Idiopathic hypersomnia 12/20/2017    Obesity (BMI 30-39.9) 12/20/2017    Shift work sleep disorder 12/20/2017     Past Surgical History:   Procedure Laterality Date    HX ORTHOPAEDIC       No Known Allergies  Current Outpatient Medications   Medication Sig Dispense Refill    methylphenidate HCl (RITALIN) 20 mg tablet Take 1 Tab by mouth two (2) times a day.  Max Daily Amount: 40 mg. 60 Tab 0    hydroCHLOROthiazide (HYDRODIURIL) 25 mg tablet Take 1 Tab by mouth daily. 90 Tab 3    citalopram (CELEXA) 20 mg tablet Take 1 Tab by mouth daily. 90 Tab 3    mesalamine (PENTASA) 500 mg CR capsule Take 500 mg by mouth three (3) times daily.        Social History     Socioeconomic History    Marital status:      Spouse name: Not on file    Number of children: Not on file    Years of education: Not on file    Highest education level: Not on file   Tobacco Use    Smoking status: Never Smoker    Smokeless tobacco: Never Used     Family History   Problem Relation Age of Onset    Hypertension Mother     Diabetes Mother     Diabetes Father     Cancer Father        Health Maintenance   Topic Date Due    Breast Cancer Screen Mammogram  07/09/2020    Colonoscopy  08/28/2020    Influenza Age 5 to Adult  08/01/2020    PAP AKA CERVICAL CYTOLOGY  07/09/2021    Lipid Screen  06/08/2025    DTaP/Tdap/Td series (2 - Td) 07/28/2029    Hepatitis C Screening  Completed    Shingrix Vaccine Age 50>  Completed    Pneumococcal 0-64 years  Aged Out        Review of Systems  Constitutional: negative for fevers, chills, anorexia and weight loss  Eyes:   negative for visual disturbance and irritation  ENT:   negative for tinnitus,sore throat,nasal congestion,ear pain,hoarseness  Respiratory:  negative for cough, hemoptysis, dyspnea,wheezing  CV:   negative for chest pain, palpitations, lower extremity edema  GI:   negative for nausea, vomiting, diarrhea, abdominal pain,melena  Endo:               negative for polyuria,polydipsia,polyphagia,heat intolerance  Genitourinary: negative for frequency, dysuria and hematuria  Integumentary: negative for rash and pruritus  Hematologic:  negative for easy bruising and gum/nose bleeding  Musculoskel: negative for myalgias, arthralgias, back pain, muscle weakness, joint pain  Neurological:  negative for headaches, dizziness, vertigo, memory problems and gait   Behavl/Psych: negative for feelings of anxiety, depression, mood changes  ROS otherwise negative      Objective:  Visit Vitals  /68 (BP 1 Location: Left arm, BP Patient Position: Sitting)   Pulse (!) 110   Temp 98.6 °F (37 °C) (Oral)   Resp 20   Ht 5' 6\" (1.676 m)   Wt 255 lb 3.2 oz (115.8 kg)   SpO2 96%   BMI 41.19 kg/m²     Body mass index is 41.19 kg/m². Physical Exam:   General appearance - alert, well appearing, and in no distress  Mental status - alert, oriented to person, place, and time  EYE-ANA MARIA, EOMI,conjunctiva normal bilaterally, lids normal  ENT-ENT exam normal, no neck nodes or sinus tenderness  Nose - normal and patent, no erythema,  Or discharge   Mouth - mucous membranes moist, pharynx normal without lesions  Neck - supple, no significant adenopathy or bruit  Chest - clear to auscultation, no wheezes, rales or rhonchi. Heart - normal rate, regular rhythm, normal S1, S2, no murmurs, rubs, clicks or gallops   Abdomen - soft, nontender, nondistended, no masses or organomegaly  Lymph- no adenopathy palpable  Ext-peripheral pulses normal, no pedal edema, no clubbing or cyanosis  Skin-Warm and dry. no hyperpigmentation, vitiligo, or suspicious lesions  Neuro -alert, oriented, normal speech, no focal findings or movement disorder noted      Assessment/Plan:  Diagnoses and all orders for this visit:    Essential hypertension    Idiopathic hypersomnia  -     methylphenidate HCl (RITALIN) 20 mg tablet; Take 1 Tab by mouth two (2) times a day. Max Daily Amount: 40 mg., Normal, Disp-60 Tab, R-0    Depression, unspecified depression type    Crohn's disease without complication, unspecified gastrointestinal tract location [facility])        Other instructions: The patient's medications were reviewed and reconciled.   No change in her current medical regimen will be made today    A no added salt, prudent diet is encouraged    Controlled Substance Monitoring:    RX Monitoring 6/17/2020   Periodic Controlled Substance Monitoring Possible medication side effects, risk of tolerance/dependence & alternative treatments discussed. ;No signs of potential drug abuse or diversion identified. Labs dated 6/8 were reviewed with the patient today. Follow-up 6 months    Follow-up and Dispositions    · Return in about 6 months (around 12/17/2020). I have reviewed with the patient details of the assessment and plan and all questions were answered. Relevent patient education was performed. The most recent lab findings were reviewed with the patient. An After Visit Summary was printed and given to the patient. Lisbeth Arrington MD    Please note that this dictation was completed with [employer], the computer voice recognition software. Quite often unanticipated grammatical, syntax, homophones, and other interpretive errors are inadvertently transcribed by the computer software. Please disregard these errors. Please excuse any errors that have escaped final proofreading.

## 2020-06-17 NOTE — PATIENT INSTRUCTIONS
Depression and Chronic Disease: Care Instructions  Your Care Instructions     A chronic disease is one that you have for a long time. Some chronic diseases can be controlled, but they usually cannot be cured. Depression is common in people with chronic diseases, but it often goes unnoticed. Many people have concerns about seeking treatment for a mental health problem. You may think it's a sign of weakness, or you don't want people to know about it. It's important to overcome these reasons for not seeking treatment. Treating depression or anxiety is good for your health. Follow-up care is a key part of your treatment and safety. Be sure to make and go to all appointments, and call your doctor if you are having problems. It's also a good idea to know your test results and keep a list of the medicines you take. How can you care for yourself at home? Watch for symptoms of depression  The symptoms of depression are often subtle at first. You may think they are caused by your disease rather than depression. Or you may think it is normal to be depressed when you have a chronic disease. If you are depressed you may:  · Feel sad or hopeless. · Feel guilty or worthless. · Not enjoy the things you used to enjoy. · Feel hopeless, as though life is not worth living. · Have trouble thinking or remembering. · Have low energy, and you may not eat or sleep well. · Pull away from others. · Think often about death or killing yourself. (Keep the numbers for these national suicide hotlines: 1-565-609-TALK [1-314.485.7805] and 3-432-MRIMZLB [1-401.181.4693]. )  Get treatment  By treating your depression, you can feel more hopeful and have more energy. If you feel better, you may take better care of yourself, so your health may improve. · Talk to your doctor if you have any changes in mood during treatment for your disease. · Ask your doctor for help.  Counseling, antidepressant medicine, or a combination of the two can help most people with depression. Often a combination works best. Counseling can also help you cope with having a chronic disease. When should you call for help? [id number] anytime you think you may need emergency care. For example, call if:  · You feel like hurting yourself or someone else. · Someone you know has depression and is about to attempt or is attempting suicide. Call your doctor now or seek immediate medical care if:  · You hear voices. · Someone you know has depression and:  ? Starts to give away his or her possessions. ? Uses illegal drugs or drinks alcohol heavily. ? Talks or writes about death, including writing suicide notes or talking about guns, knives, or pills. ? Starts to spend a lot of time alone. ? Acts very aggressively or suddenly appears calm. Watch closely for changes in your health, and be sure to contact your doctor if:  · You do not get better as expected. Where can you learn more? Go to http://doe-melani.info/  Enter A548 in the search box to learn more about \"Depression and Chronic Disease: Care Instructions. \"  Current as of: January 31, 2020               Content Version: 12.5  © 4403-2053 [employer]. Care instructions adapted under license by [employer] (which disclaims liability or warranty for this information). If you have questions about a medical condition or this instruction, always ask your healthcare professional. [address] any warranty or liability for your use of this information.

## 2020-06-17 NOTE — LETTER
Mike Aparicio :1965 MR #:[id number] Provider Jayce Warren MD  
*OMKI-796* BSMG-491 () Page 1 of 5 Initial [employer] CONTROLLED SUBSTANCE AGREEMENT I may be prescribed medications that are controlled substances as part  of my treatment plan for management of my medical condition(s). The goal of my treatment plan is to maintain and/or improve my health and wellbeing. Because controlled substances have an increased risk of abuse or harm, continual re-evaluation is needed determine if the goals of my treatment plan are being met for my safety and the safety of others. Edilson Pantoja  am entering into this Controlled Substance Agreement with my provider, Caroline Hale MD at [address] [place] [address] . I understand that successful treatment requires mutual trust and honesty between me and my provider. I understand that there are state and federal laws and regulations which apply to the medications that my provider may prescribe that must be followed. I understand there are risks and benefits ts of taking the medicines that my provider may prescribe. I understand and agree that following this Agreement is necessary in continuing my provider-patient relationship and success of my treatment plan. As a part of my treatment plan, I agree to the following: COMMUNICATION: 
 
1. I will communicate fully with my provider about my medical condition(s), including the effect on my daily life and how well my medications are helping. I will tell my provider all of the medications that I take for any reason, including medications I receive from another health care provider, and will notify my provider about all issues, problems or concerns, including any side effects, which may be related to my medications.  
 
I understand that this information allows my provider to adjust my treatment plan to help manage my medical condition. I understand that this information will become part of my permanent medical record. 2. I will notify my provider if I have a history of alcohol/drug misuse/addiction or if I have had treatment for alcohol/drug addiction in the past, or if I have a new problem with or concern about alcohol/drug use/addiction, because this increases the likelihood of high risk behaviors and may lead to serious medical conditions. 3. Females Only: I will notify my provider if I am or become pregnant, or if I intend to become pregnant, or if I intend to breastfeed. I understand that communication of these issues with my provider is important, due to possible effects my medication could have on an unborn fetus or breastfeeding child. Angelina Walls :1965 MR #:[id number] Provider Jennifer Murphy MD  
*LVPG-793* BSMG-491 () Page 2 of 5 Initial [employer] MISUSE OF MEDICATIONS / DRUGS: 
 
1. I agree to take all controlled substances as prescribed, and will not misuse or abuse any controlled substances prescribed by my provider. For my safety, I will not increase the amount of medicine I take without first talking with and getting permission from my provider. 2. If I have a medical emergency, another health care provider may prescribe me medication. If I seek emergency treatment, I will notify my provider within seventy-two (72) hours. 3. I understand that my provider may discuss my use and/or possible misuse/abuse of controlled substances and alcohol, as appropriate, with any health care provider involved in my care, pharmacist or legal authority. ILLEGAL DRUGS: 
 
1. I will not use illegal drugs of any kind, including but not limited to marijuana, heroin, cocaine, or any prescription drug which is not prescribed to me.  
 
DRUG DIVERSION / PRESCRIPTION FRAUD: 
 
 1. I will not share, sell, trade, give away, or otherwise misuse my prescriptions or medications. 2. I will not alter any prescriptions provided to me by my provider. SINGLE PROVIDER: 
 
1. I agree that all controlled substances that I take will be prescribed only by my provider (or his/her covering provider) under this Agreement. This agreement does not prevent me from seeking emergency medical treatment or receiving pain management related to a surgery. PROTECTING MEDICATIONS: 
 
1. I am responsible for keeping my prescriptions and medications in a safe and secure place including safeguarding them from loss or theft. I understand that lost, stolen or damaged/destroyed prescriptions or medications will not be replaced. Lida Dumont :1965 MR #:[id number] Provider Dean Hernandez MD  
*KJLO-862* BSMG-491 () Page 3 of 5 Initial [employer] PRESCRIPTION RENEWALS/REFILLS: 
 
 
1. I authorize my provider and my pharmacy to cooperate fully with any local, state, or federal law enforcement agency in the investigation of any possible misuse, sale, or other diversion of my controlled substance prescriptions or medications. RISKS: 
 
 
1. I understand that if I do not adhere to this Agreement in any way, my provider may change my prescriptions, stop prescribing controlled substances or end our provider-patient relationship. 2. If my provider decides to stop prescribing medication, or decides to end our provider-patient relationship,my provider may require that I taper my medications slowly. If necessary, my provider may also provide a prescription for other medications to treat my withdrawal symptoms. UNDERSTANDING THIS AGREEMENT: 
 
I understand that my provider may adjust or stop my prescriptions for controlled substances based on my medical condition and my treatment plan. I understand that this Agreement does not guarantee that I will be prescribed medications or controlled substances. I understand that controlled substances may be just one part 
of my treatment plan. My initial on each page and my signature below shows that I have read each page of this Agreement, I have had an opportunity to ask questions, and all of my questions have been answered to my satisfaction by my provider. By signing below, I agree to comply with this Agreement, and I understand that if I do not follow the Agreements listed above, my provider may stop 
 
 
 
_________________________________________  Date/Time 6/17/2020 2:36 PM   
             (Patient Signature)

## 2020-07-18 ENCOUNTER — PATIENT MESSAGE (OUTPATIENT)
Dept: INTERNAL MEDICINE CLINIC | Age: 55
End: 2020-07-18

## 2020-07-18 DIAGNOSIS — G47.11 IDIOPATHIC HYPERSOMNIA: ICD-10-CM

## 2020-07-20 RX ORDER — METHYLPHENIDATE HYDROCHLORIDE 20 MG/1
20 TABLET ORAL 2 TIMES DAILY
Qty: 60 TAB | Refills: 0 | Status: SHIPPED | OUTPATIENT
Start: 2020-07-20 | End: 2020-08-20 | Stop reason: SDUPTHER

## 2020-08-20 ENCOUNTER — PATIENT MESSAGE (OUTPATIENT)
Dept: INTERNAL MEDICINE CLINIC | Age: 55
End: 2020-08-20

## 2020-08-20 DIAGNOSIS — G47.11 IDIOPATHIC HYPERSOMNIA: ICD-10-CM

## 2020-08-20 RX ORDER — METHYLPHENIDATE HYDROCHLORIDE 20 MG/1
20 TABLET ORAL 2 TIMES DAILY
Qty: 60 TAB | Refills: 0 | Status: SHIPPED | OUTPATIENT
Start: 2020-08-20 | End: 2020-09-22 | Stop reason: SDUPTHER

## 2020-08-20 NOTE — TELEPHONE ENCOUNTER
Last Refill: 7-20-20  Last Visit: 6/17/2020   Next Visit: Visit date not found     Requested Prescriptions     Pending Prescriptions Disp Refills    methylphenidate HCl (RITALIN) 20 mg tablet 60 Tab 0     Sig: Take 1 Tab by mouth two (2) times a day. Max Daily Amount: 40 mg.

## 2020-08-20 NOTE — TELEPHONE ENCOUNTER
From: Nany Mann  To: Kingsley Pandey MD  Sent: 8/20/2020 3:24 PM EDT  Subject: Prescription Question    Can you please send an e-script for methylphenidate 20 mg to the [facility] on [address]?     Thank you,  Issa Chen

## 2020-09-22 ENCOUNTER — PATIENT MESSAGE (OUTPATIENT)
Dept: INTERNAL MEDICINE CLINIC | Age: 55
End: 2020-09-22

## 2020-09-22 DIAGNOSIS — G47.11 IDIOPATHIC HYPERSOMNIA: ICD-10-CM

## 2020-09-22 RX ORDER — METHYLPHENIDATE HYDROCHLORIDE 20 MG/1
20 TABLET ORAL 2 TIMES DAILY
Qty: 60 TAB | Refills: 0 | Status: SHIPPED | OUTPATIENT
Start: 2020-09-22 | End: 2020-10-26 | Stop reason: SDUPTHER

## 2020-09-22 NOTE — TELEPHONE ENCOUNTER
Last Refill: 8-20-20  Last Visit: 6/17/2020   Next Visit: Visit date not found     Requested Prescriptions     Pending Prescriptions Disp Refills    methylphenidate HCl (RITALIN) 20 mg tablet 60 Tab 0     Sig: Take 1 Tab by mouth two (2) times a day. Max Daily Amount: 40 mg.

## 2020-09-22 NOTE — TELEPHONE ENCOUNTER
From: Nuha Selby  To: Eddi Yu MD  Sent: 9/22/2020 10:10 AM EDT  Subject: Prescription Question    Good morning,    Can you please send a prescription for methylphenidate 20 mg tab to the [facility] on [address]?     Thanks,  Xavier Recio

## 2020-10-26 ENCOUNTER — PATIENT MESSAGE (OUTPATIENT)
Dept: INTERNAL MEDICINE CLINIC | Age: 55
End: 2020-10-26

## 2020-10-26 DIAGNOSIS — G47.11 IDIOPATHIC HYPERSOMNIA: ICD-10-CM

## 2020-10-26 RX ORDER — METHYLPHENIDATE HYDROCHLORIDE 20 MG/1
20 TABLET ORAL 2 TIMES DAILY
Qty: 60 TAB | Refills: 0 | Status: SHIPPED | OUTPATIENT
Start: 2020-10-26 | End: 2020-12-04 | Stop reason: SDUPTHER

## 2020-10-26 NOTE — TELEPHONE ENCOUNTER
From: Elizabeth Foster  To: Popeye Andrade MD  Sent: 10/26/2020 2:55 PM EDT  Subject: Prescription Question    Can you please send a prescription for methylphenidate 20 mg tablets to the [facility] on [address] [place] [address]? Thank you for your assistance in this matter.     Sincerely,  Landry Grimm

## 2020-12-04 ENCOUNTER — PATIENT MESSAGE (OUTPATIENT)
Dept: INTERNAL MEDICINE CLINIC | Age: 55
End: 2020-12-04

## 2020-12-04 DIAGNOSIS — G47.11 IDIOPATHIC HYPERSOMNIA: ICD-10-CM

## 2020-12-04 RX ORDER — METHYLPHENIDATE HYDROCHLORIDE 20 MG/1
20 TABLET ORAL 2 TIMES DAILY
Qty: 60 TAB | Refills: 0 | Status: SHIPPED | OUTPATIENT
Start: 2020-12-04 | End: 2021-01-06 | Stop reason: SDUPTHER

## 2020-12-04 NOTE — TELEPHONE ENCOUNTER
From: Grupo Sousa  To: Meng Villanueva MD  Sent: 12/4/2020 12:49 PM EST  Subject: Prescription Question    Good Afternoon,    Can you please send a prescription for methylphenidate 20 mg tab to the [facility] on [address] [place] [address]?     Thanks,  Sita Saul

## 2020-12-21 ENCOUNTER — OFFICE VISIT (OUTPATIENT)
Dept: INTERNAL MEDICINE CLINIC | Age: 55
End: 2020-12-21
Payer: COMMERCIAL

## 2020-12-21 VITALS
SYSTOLIC BLOOD PRESSURE: 128 MMHG | WEIGHT: 255 LBS | OXYGEN SATURATION: 97 % | DIASTOLIC BLOOD PRESSURE: 76 MMHG | BODY MASS INDEX: 40.98 KG/M2 | HEART RATE: 98 BPM | TEMPERATURE: 98.9 F | RESPIRATION RATE: 16 BRPM | HEIGHT: 66 IN

## 2020-12-21 DIAGNOSIS — K50.90 CROHN'S DISEASE WITHOUT COMPLICATION, UNSPECIFIED GASTROINTESTINAL TRACT LOCATION (HCC): ICD-10-CM

## 2020-12-21 DIAGNOSIS — I10 ESSENTIAL HYPERTENSION: Primary | Chronic | ICD-10-CM

## 2020-12-21 DIAGNOSIS — F32.A DEPRESSION, UNSPECIFIED DEPRESSION TYPE: ICD-10-CM

## 2020-12-21 DIAGNOSIS — E66.01 OBESITY, MORBID (HCC): ICD-10-CM

## 2020-12-21 DIAGNOSIS — G47.11 IDIOPATHIC HYPERSOMNIA: ICD-10-CM

## 2020-12-21 PROCEDURE — 99214 OFFICE O/P EST MOD 30 MIN: CPT | Performed by: INTERNAL MEDICINE

## 2020-12-21 NOTE — PATIENT INSTRUCTIONS
DASH Diet: Care Instructions Your Care Instructions The DASH diet is an eating plan that can help lower your blood pressure. DASH stands for Dietary Approaches to Stop Hypertension. Hypertension is high blood pressure. The DASH diet focuses on eating foods that are high in calcium, potassium, and magnesium. These nutrients can lower blood pressure. The foods that are highest in these nutrients are fruits, vegetables, low-fat dairy products, nuts, seeds, and legumes. But taking calcium, potassium, and magnesium supplements instead of eating foods that are high in those nutrients does not have the same effect. The DASH diet also includes whole grains, fish, and poultry. The DASH diet is one of several lifestyle changes your doctor may recommend to lower your high blood pressure. Your doctor may also want you to decrease the amount of sodium in your diet. Lowering sodium while following the DASH diet can lower blood pressure even further than just the DASH diet alone. Follow-up care is a key part of your treatment and safety. Be sure to make and go to all appointments, and call your doctor if you are having problems. It's also a good idea to know your test results and keep a list of the medicines you take. How can you care for yourself at home? Following the DASH diet · Eat 4 to 5 servings of fruit each day. A serving is 1 medium-sized piece of fruit, ½ cup chopped or canned fruit, 1/4 cup dried fruit, or 4 ounces (½ cup) of fruit juice. Choose fruit more often than fruit juice. · Eat 4 to 5 servings of vegetables each day. A serving is 1 cup of lettuce or raw leafy vegetables, ½ cup of chopped or cooked vegetables, or 4 ounces (½ cup) of vegetable juice. Choose vegetables more often than vegetable juice. · Get 2 to 3 servings of low-fat and fat-free dairy each day. A serving is 8 ounces of milk, 1 cup of yogurt, or 1 ½ ounces of cheese. · Eat 6 to 8 servings of grains each day. A serving is 1 slice of bread, 1 ounce of dry cereal, or ½ cup of cooked rice, pasta, or cooked cereal. Try to choose whole-grain products as much as possible. · Limit lean meat, poultry, and fish to 2 servings each day. A serving is 3 ounces, about the size of a deck of cards. · Eat 4 to 5 servings of nuts, seeds, and legumes (cooked dried beans, lentils, and split peas) each week. A serving is 1/3 cup of nuts, 2 tablespoons of seeds, or ½ cup of cooked beans or peas. · Limit fats and oils to 2 to 3 servings each day. A serving is 1 teaspoon of vegetable oil or 2 tablespoons of salad dressing. · Limit sweets and added sugars to 5 servings or less a week. A serving is 1 tablespoon jelly or jam, ½ cup sorbet, or 1 cup of lemonade. · Eat less than 2,300 milligrams (mg) of sodium a day. If you limit your sodium to 1,500 mg a day, you can lower your blood pressure even more. Tips for success · Start small. Do not try to make dramatic changes to your diet all at once. You might feel that you are missing out on your favorite foods and then be more likely to not follow the plan. Make small changes, and stick with them. Once those changes become habit, add a few more changes. · Try some of the following: ? Make it a goal to eat a fruit or vegetable at every meal and at snacks. This will make it easy to get the recommended amount of fruits and vegetables each day. ? Try yogurt topped with fruit and nuts for a snack or healthy dessert. ? Add lettuce, tomato, cucumber, and onion to sandwiches. ? Combine a ready-made pizza crust with low-fat mozzarella cheese and lots of vegetable toppings. Try using tomatoes, squash, spinach, broccoli, carrots, cauliflower, and onions. ? Have a variety of cut-up vegetables with a low-fat dip as an appetizer instead of chips and dip. ? Sprinkle sunflower seeds or chopped almonds over salads. Or try adding chopped walnuts or almonds to cooked vegetables. ? Try some vegetarian meals using beans and peas. Add garbanzo or kidney beans to salads. Make burritos and tacos with mashed marte beans or black beans. Where can you learn more? Go to http://www.gray.com/ Enter I815 in the search box to learn more about \"DASH Diet: Care Instructions. \" Current as of: December 16, 2019               Content Version: 12.6 © 3887-1685 [employer]. Care instructions adapted under license by [employer] (which disclaims liability or warranty for this information). If you have questions about a medical condition or this instruction, always ask your healthcare professional. [address] any warranty or liability for your use of this information.

## 2020-12-21 NOTE — PROGRESS NOTES
Ollie Argueta is a 63 y.o. female     Chief Complaint   Patient presents with    Hypertension     6 month follow up       Visit Vitals  /76 (BP 1 Location: Left arm, BP Patient Position: Sitting)   Pulse 98   Temp 98.9 °F (37.2 °C) (Temporal)   Resp 16   Ht 5' 6\" (1.676 m)   Wt 255 lb (115.7 kg)   SpO2 97%   BMI 41.16 kg/m²       Health Maintenance Due   Topic Date Due    Breast Cancer Screen Mammogram  07/09/2020       1. Have you been to the ER, urgent care clinic since your last visit? Hospitalized since your last visit? No    2. Have you seen or consulted any other health care providers outside of the [address] [place] [address] since your last visit? Include any pap smears or colon screening.  No

## 2020-12-21 NOTE — PROGRESS NOTES
Fredna Oppenheim is a 63 y.o. female and presents with Hypertension (6 month follow up)  . Subjective:  Mrs. eDandra Benoit presents to the office today in general medical follow-up. The patient has hypertension and remains on hydrochlorothiazide. She is tolerating this without orthostatic dizziness, muscle cramping. She denies headaches, numbness, tingling or focal neurological problems. She has a history of depression currently on Celexa. The patient notes that the medication continues to work effectively for her without tolerance. She has had no long-term side effects related to the medication and has been pleased with the effect that the medication has had. She has idiopathic hypersomnia which is been evaluated by sleep medicine and she has been placed on Ritalin. The medication continues to work effectively for her in regards to maintaining her alertness such that she is able to perform ADLs and work gainfully. She has had no tolerance to the medication over time or long-term side effects related to its usage. She has Crohn's disease and remains on mesalamine. She has had no side effects related to the medication and no flareups of the disease. Past Medical History:   Diagnosis Date    Allergic rhinitis 12/20/2017    Crohn's disease ([address] 12/20/2017    Depression 12/20/2017    Essential hypertension 12/20/2017    Idiopathic hypersomnia 12/20/2017    Shift work sleep disorder 12/20/2017     Past Surgical History:   Procedure Laterality Date    HX ORTHOPAEDIC       No Known Allergies  Current Outpatient Medications   Medication Sig Dispense Refill    methylphenidate HCl (RITALIN) 20 mg tablet Take 1 Tab by mouth two (2) times a day. Max Daily Amount: 40 mg. 60 Tab 0    hydroCHLOROthiazide (HYDRODIURIL) 25 mg tablet Take 1 Tab by mouth daily. 90 Tab 3    citalopram (CELEXA) 20 mg tablet Take 1 Tab by mouth daily.  90 Tab 3    mesalamine (PENTASA) 500 mg CR capsule Take 500 mg by mouth three (3) times daily.        Social History     Socioeconomic History    Marital status:      Spouse name: Not on file    Number of children: Not on file    Years of education: Not on file    Highest education level: Not on file   Tobacco Use    Smoking status: Never Smoker    Smokeless tobacco: Never Used     Family History   Problem Relation Age of Onset    Hypertension Mother     Diabetes Mother     Diabetes Father     Cancer Father        Health Maintenance   Topic Date Due    Breast Cancer Screen Mammogram  07/09/2020    PAP AKA CERVICAL CYTOLOGY  07/09/2021    Colorectal Cancer Screening Combo  11/12/2023    Lipid Screen  06/08/2025    DTaP/Tdap/Td series (2 - Td) 07/28/2029    Hepatitis C Screening  Completed    Shingrix Vaccine Age 50>  Completed    Flu Vaccine  Completed    Pneumococcal 0-64 years  Aged Out        Review of Systems  Constitutional: negative for fevers, chills, anorexia and weight loss  Eyes:   negative for visual disturbance and irritation  ENT:   negative for tinnitus,sore throat,nasal congestion,ear pain,hoarseness  Respiratory:  negative for cough, hemoptysis, dyspnea,wheezing  CV:   negative for chest pain, palpitations, lower extremity edema  GI:   negative for nausea, vomiting, diarrhea, abdominal pain,melena  Endo:               negative for polyuria,polydipsia,polyphagia,heat intolerance  Genitourinary: negative for frequency, dysuria and hematuria  Integumentary: negative for rash and pruritus  Hematologic:  negative for easy bruising and gum/nose bleeding  Musculoskel: negative for myalgias, arthralgias, back pain, muscle weakness, joint pain  Neurological:  negative for headaches, dizziness, vertigo, memory problems and gait   Behavl/Psych: negative for feelings of anxiety, depression, mood changes  ROS otherwise negative      Objective:  Visit Vitals  /76 (BP 1 Location: Left arm, BP Patient Position: Sitting)   Pulse 98   Temp 98.9 °F (37.2 °C) (Temporal)   Resp 16   Ht 5' 6\" (1.676 m)   Wt 255 lb (115.7 kg)   SpO2 97%   BMI 41.16 kg/m²     Body mass index is 41.16 kg/m². Physical Exam:   General appearance - alert, well appearing, and in no distress  Mental status - alert, oriented to person, place, and time  EYE-ANA MARIA, EOMI,conjunctiva normal bilaterally, lids normal  ENT-ENT exam normal, no neck nodes or sinus tenderness  Nose - normal and patent, no erythema,  Or discharge   Mouth - mucous membranes moist, pharynx normal without lesions  Neck - supple, no significant adenopathy or bruit  Chest - clear to auscultation, no wheezes, rales or rhonchi. Heart - normal rate, regular rhythm, normal S1, S2, no murmurs, rubs, clicks or gallops   Abdomen - soft, nontender, nondistended, no masses or organomegaly  Lymph- no adenopathy palpable  Ext-peripheral pulses normal, no pedal edema, no clubbing or cyanosis  Skin-Warm and dry. no hyperpigmentation, vitiligo, or suspicious lesions  Neuro -alert, oriented, normal speech, no focal findings or movement disorder noted      Assessment/Plan:  Diagnoses and all orders for this visit:    Essential hypertension    Depression, unspecified depression type    Idiopathic hypersomnia    Crohn's disease without complication, unspecified gastrointestinal tract location ([address]    Obesity, morbid ([address]        Other instructions: The patient's medications were reviewed and reconciled. No change in her current medical regimen will be made. A no added salt, prudent diet is encouraged    Weight loss recommended due to body mass index of 41.2    Labs from 6/8 were reviewed with the patient today    Follow-up 6 months    Follow-up and Dispositions    · Return in about 6 months (around 6/21/2021). I have reviewed with the patient details of the assessment and plan and all questions were answered. Relevent patient education was performed. The most recent lab findings were reviewed with the patient.     An After Visit Summary was printed and given to the patient. Kb Landon MD    Please note that this dictation was completed with [employer], the computer voice recognition software. Quite often unanticipated grammatical, syntax, homophones, and other interpretive errors are inadvertently transcribed by the computer software. Please disregard these errors. Please excuse any errors that have escaped final proofreading.

## 2021-01-04 RX ORDER — CITALOPRAM 20 MG/1
20 TABLET, FILM COATED ORAL DAILY
Qty: 90 TAB | Refills: 2 | Status: SHIPPED | OUTPATIENT
Start: 2021-01-04 | End: 2022-03-23 | Stop reason: SDUPTHER

## 2021-01-04 NOTE — TELEPHONE ENCOUNTER
Requested Prescriptions     Pending Prescriptions Disp Refills    citalopram (CeleXA) 20 mg tablet 90 Tab 3     Sig: Take 1 Tab by mouth daily.        Last Refill: 4/5/19  Next Appointment:6/24/21

## 2021-01-06 ENCOUNTER — PATIENT MESSAGE (OUTPATIENT)
Dept: INTERNAL MEDICINE CLINIC | Age: 56
End: 2021-01-06

## 2021-01-06 DIAGNOSIS — G47.11 IDIOPATHIC HYPERSOMNIA: ICD-10-CM

## 2021-01-06 RX ORDER — METHYLPHENIDATE HYDROCHLORIDE 20 MG/1
20 TABLET ORAL 2 TIMES DAILY
Qty: 60 TAB | Refills: 0 | Status: SHIPPED | OUTPATIENT
Start: 2021-01-06 | End: 2021-02-08 | Stop reason: SDUPTHER

## 2021-01-06 NOTE — TELEPHONE ENCOUNTER
From: Reva Carter  To: Torsten Worley MD  Sent: 1/6/2021 12:29 PM EST  Subject: Prescription Question    Good afternoon,    Can you please send a prescription for methylphenidate 20 mg tab to the [facility] on [address] [place] [address]?     Thanks,  Steven Youssef

## 2021-02-08 DIAGNOSIS — G47.11 IDIOPATHIC HYPERSOMNIA: ICD-10-CM

## 2021-02-08 RX ORDER — METHYLPHENIDATE HYDROCHLORIDE 20 MG/1
20 TABLET ORAL 2 TIMES DAILY
Qty: 60 TAB | Refills: 0 | Status: SHIPPED | OUTPATIENT
Start: 2021-02-08 | End: 2021-03-08 | Stop reason: SDUPTHER

## 2021-03-08 ENCOUNTER — PATIENT MESSAGE (OUTPATIENT)
Dept: INTERNAL MEDICINE CLINIC | Age: 56
End: 2021-03-08

## 2021-03-08 DIAGNOSIS — G47.11 IDIOPATHIC HYPERSOMNIA: ICD-10-CM

## 2021-03-08 RX ORDER — METHYLPHENIDATE HYDROCHLORIDE 20 MG/1
20 TABLET ORAL 2 TIMES DAILY
Qty: 60 TAB | Refills: 0 | Status: SHIPPED | OUTPATIENT
Start: 2021-03-08 | End: 2021-04-16 | Stop reason: SDUPTHER

## 2021-03-10 NOTE — TELEPHONE ENCOUNTER
From: Cuauhtemoc Chawla  To: Melita Durand MD  Sent: 3/8/2021 10:52 AM EST  Subject: Prescription Question    Good morning,    It's time to start the process of pre-authorization for methylphenidate. Is the best fax number to use [phone], or [phone]?     Thanks,  Jaimee Carbajal

## 2021-04-16 DIAGNOSIS — G47.11 IDIOPATHIC HYPERSOMNIA: ICD-10-CM

## 2021-04-19 RX ORDER — METHYLPHENIDATE HYDROCHLORIDE 20 MG/1
20 TABLET ORAL 2 TIMES DAILY
Qty: 60 TAB | Refills: 0 | Status: SHIPPED | OUTPATIENT
Start: 2021-04-19 | End: 2021-05-28 | Stop reason: SDUPTHER

## 2021-05-28 DIAGNOSIS — G47.11 IDIOPATHIC HYPERSOMNIA: ICD-10-CM

## 2021-05-28 NOTE — TELEPHONE ENCOUNTER
PCP: Kylie Vail MD    Last appt: 12/21/2020  Future Appointments   Date Time Provider Jose Mina   6/24/2021  9:30 AM Rohini Terrell MD [facility]       Requested Prescriptions     Pending Prescriptions Disp Refills    methylphenidate HCl (RITALIN) 20 mg tablet 60 Tablet 0     Sig: Take 1 Tablet by mouth two (2) times a day.  Max Daily Amount: 40 mg.       Prior labs and Blood pressures:  BP Readings from Last 3 Encounters:   12/21/20 128/76   06/17/20 120/68   12/17/19 126/76     Lab Results   Component Value Date/Time    Sodium 139 06/08/2020 09:18 AM    Potassium 4.1 06/08/2020 09:18 AM    Chloride 101 06/08/2020 09:18 AM    CO2 23 06/08/2020 09:18 AM    Glucose 123 (H) 06/08/2020 09:18 AM    BUN 17 06/08/2020 09:18 AM    Creatinine 0.79 06/08/2020 09:18 AM    BUN/Creatinine ratio 22 06/08/2020 09:18 AM    GFR est AA 97 06/08/2020 09:18 AM    GFR est non-AA 84 06/08/2020 09:18 AM    Calcium 9.4 06/08/2020 09:18 AM     No results found for: HBA1C, CSF8LWYW, RTV8TEAP  Lab Results   Component Value Date/Time    Cholesterol, total 159 06/08/2020 09:18 AM    HDL Cholesterol 56 06/08/2020 09:18 AM    LDL, calculated 87 06/08/2020 09:18 AM    VLDL, calculated 16 06/08/2020 09:18 AM    Triglyceride 78 06/08/2020 09:18 AM     No results found for: CLARY Ramírez    Lab Results   Component Value Date/Time    TSH 3.470 06/08/2020 09:18 AM

## 2021-05-29 RX ORDER — METHYLPHENIDATE HYDROCHLORIDE 20 MG/1
20 TABLET ORAL 2 TIMES DAILY
Qty: 60 TABLET | Refills: 0 | Status: SHIPPED | OUTPATIENT
Start: 2021-05-29 | End: 2021-07-13 | Stop reason: SDUPTHER

## 2021-06-14 RX ORDER — HYDROCHLOROTHIAZIDE 25 MG/1
25 TABLET ORAL DAILY
Qty: 90 TABLET | Refills: 1 | Status: SHIPPED | OUTPATIENT
Start: 2021-06-14 | End: 2021-12-26 | Stop reason: SDUPTHER

## 2021-06-24 ENCOUNTER — OFFICE VISIT (OUTPATIENT)
Dept: INTERNAL MEDICINE CLINIC | Age: 56
End: 2021-06-24
Payer: COMMERCIAL

## 2021-06-24 VITALS
SYSTOLIC BLOOD PRESSURE: 124 MMHG | HEART RATE: 81 BPM | OXYGEN SATURATION: 98 % | HEIGHT: 66 IN | WEIGHT: 246.2 LBS | RESPIRATION RATE: 16 BRPM | BODY MASS INDEX: 39.57 KG/M2 | DIASTOLIC BLOOD PRESSURE: 82 MMHG | TEMPERATURE: 98.2 F

## 2021-06-24 DIAGNOSIS — I10 ESSENTIAL HYPERTENSION: Primary | Chronic | ICD-10-CM

## 2021-06-24 DIAGNOSIS — E66.01 OBESITY, MORBID (HCC): ICD-10-CM

## 2021-06-24 DIAGNOSIS — K50.90 CROHN'S DISEASE WITHOUT COMPLICATION, UNSPECIFIED GASTROINTESTINAL TRACT LOCATION (HCC): ICD-10-CM

## 2021-06-24 DIAGNOSIS — G47.11 IDIOPATHIC HYPERSOMNIA: ICD-10-CM

## 2021-06-24 DIAGNOSIS — F32.A DEPRESSION, UNSPECIFIED DEPRESSION TYPE: ICD-10-CM

## 2021-06-24 PROCEDURE — 99214 OFFICE O/P EST MOD 30 MIN: CPT | Performed by: INTERNAL MEDICINE

## 2021-06-24 NOTE — LETTER
CONTROLLED SUBSTANCE MEDICATION AGREEMENT     Patient Name: Becca Abbasi  Patient YOB: 1965     I understand, that controlled substance medications may be used to help better manage my symptoms and to improve my ability to function at home, work and in social settings. However, I also understand that these medications do have risks, which have been discussed with me, including possible development of physical or psychological dependence. I understand that successful treatment requires mutual trust and honesty between me and my provider. I understand and agree that following this Medication Agreement is necessary in continuing my provider-patient relationship and the success of my treatment plan. Explanation from my Provider: Benefits and Goals of Controlled Substance Medications: There are two potential goals for your treatment: (1) decreased pain and suffering (2) improved daily life functions. There are many possible treatments for your chronic condition(s). Alternatives such as physical therapy, yoga, massage, home daily exercise, meditation, relaxation techniques, injections, chiropractic manipulations, surgery, cognitive therapy, hypnosis and many medications that are not habit-forming may be used. Use of controlled substance medications may be helpful, but they are unlikely to resolve all symptoms or restore all function. Explanation from my Provider: Risks of Controlled Substance Medications:  Opioid pain medications: These medications can lead to problems such as addiction/dependence, sedation, lightheadedness/dizziness, memory issues, falls, constipation, nausea, or vomiting. They may also impair the ability to drive or operate machinery. Additionally, these medications may lower testosterone levels, leading to loss of bone strength, stamina and sex drive.   They may cause problems with breathing, sleep apnea and reduced coughing, which is especially dangerous for patients with lung disease. Overdose or dangerous interactions with alcohol and other medications may occur, leading to death. Hyperalgesia may develop, which means patients receiving opioids for the treatment of pain may become more sensitive to certain painful stimuli, and in some cases, experience pain from ordinarily non-painful stimuli. Women between the ages of 14-53 who could become pregnant should carefully weigh the risks and benefits of opioids with their physicians, as these medications increase the risk of pregnancy complications, including miscarriage,  delivery and stillbirth. It is also possible for babies to be born addicted to opioids. Opioid dependence withdrawal symptoms may include; feelings of uneasiness, increased pain, irritability, belly pain, diarrhea, sweats and goose-flesh. Benzodiazepines and non-benzodiazepine sleep medications: These medications can lead to problems such as addiction/dependence, sedation, fatigue, lightheadedness, dizziness, incoordination, falls, depression, hallucinations, and impaired judgment, memory and concentration. The ability to drive and operate machinery may also be affected. Abnormal sleep-related behaviors have been reported, including sleepwalking, driving, making telephone calls, eating, or having sex while not fully awake. These medications can suppress breathing and worsen sleep apnea, particularly when combined with alcohol or other sedating medications, potentially leading to death. Dependence withdrawal symptoms may include tremors, anxiety, hallucinations and seizures. Initials:_______  Stimulants:  Common adverse effects include addiction/dependence, increased blood  pressure and heart rate, decreased appetite, nausea, involuntary weight loss, insomnia,  irritability, and headaches.   These risks may increase when these medications are combined with other stimulants, such as caffeine pills or energy drinks, certain weight loss supplements and oral decongestants. Dependence withdrawal symptoms may include depressed mood, loss of interest, suicidal thoughts, anxiety, fatigue, appetite changes and agitation. Testosterone replacement therapy:  Potential side effects include increased risk of stroke and heart attack, blood clots, increased blood pressure, increased cholesterol, enlarged prostate, sleep apnea, irritability/aggression and other mood disorders, and decreased fertility. I agree and understand that I and my prescriber have the following rights and responsibilities regarding my treatment plan:     1. MY RIGHTS:  To be informed of my treatment and medication plan. To be an active participant in my health and wellbeing. 2. MY RESPONSIBILITY AND UNDERSTANDING FOR USE OF MEDICATIONS   I will take medications at the dose and frequency as directed. For my safety, I will not increase or change how I take my medications without the recommendation of my healthcare provider.  I will actively participate in any program recommended by my provider which may improve function, including social, physical, psychological programs.  I will not take my medications with alcohol or other drugs not prescribed to me. I understand that drinking alcohol with my medications increases the chances of side effects, including reduced breathing rate and could lead to personal injury when operating machinery.  I understand that if I have a history of substance use disorders, including alcohol or other illicit drugs, that I may be at increased risk of addiction to my medications.  I agree to notify my provider immediately if I should become pregnant so that my treatment plan can be adjusted.    I agree and understand that I shall only receive controlled substance medications from the prescriber that signed this agreement unless there is written agreement among other prescribers of controlled substances outlining the responsibility of the medications being prescribed.  I understand that the if the controlled medication is not helping to achieve goals, the dosage may be tapered and no longer prescribed. 3. MY RESPONSIBILITY FOR COMMUNICATION / PRESCRIPTION RENEWALS   I agree that all controlled substance medications that I take will be prescribed only by my provider. If another healthcare provider prescribes me medication in an emergency, I will notify my provider within seventy-two (72) hours.  I will arrange for refills at the prescribed interval ONLY during regular office hours. I will not ask for refills earlier than agreed, after-hours, on holidays or weekends. Refills may take up to 72 hours for processing and prescriptions to reach the pharmacy.  I will inform my other health care providers that I am taking these medications and of the existence of this [id number]. In the event of an emergency, I will provide the same information to the emergency department prescribers. Initials:_______  Mayra Officer I will keep my provider updated on the pharmacy I am using for controlled medication prescription filling. 4. MY RESPONSIBILITY FOR PROTECTING MEDICATIONS   I will protect my prescriptions and medications. I understand that lost or misplaced prescriptions will not be replaced.  I will keep medications only for my own use and will not share them with others. I will keep all medications away from children.  I agree that if my medications are adjusted or discontinued, I will properly dispose of any remaining medications. I understand that I will be required to dispose of any remaining controlled medications as, directed by my prescriber, prior to being provided with any prescriptions for other controlled medications. Medication drop box locations can be found at: [employer]    5.  MY RESPONSIBILITY WITH ILLEGAL DRUGS    I will not use illegal or street drugs or another person's prescription medications not prescribed to me.  If there are identified addiction type symptoms, then referral to a program may be provided by my provider and I agree to follow through with this recommendation. 6. MY RESPONSIBILITY FOR COOPERATION WITH INVESTIGATIONS   I understand that my provider will comply with any applicable law and may discuss my use and/or possible misuse/abuse of controlled substances and alcohol, as appropriate, with any health care provider involved in my care, pharmacist, or legal authority.  I authorize my provider and pharmacy to cooperate fully with law enforcement agencies (as permitted by law) in the investigation of any possible misuse, sale, or other diversion of my controlled substances.  I agree to waive any applicable privilege or right of privacy or confidentiality with respect to these authorizations. 7. PROVIDERS RIGHT TO MONITOR FOR SAFETY: PRESCRIPTION MONITORING / DRUG TESTING   I consent to drug/toxicology screening and will submit to a drug screen upon my providers request to assure I am only taking the prescribed drugs for my safety monitoring. I understand that a drug screen is a laboratory test in which a sample of my urine, blood or saliva is checked to see what drugs I have been taking. This may entail an observed urine specimen, which means that a nurse or other health care provider may watch me provide urine, and I will cooperate if I am asked to provide an observed specimen.  I understand that my provider will check a copy of my State Prescription Monitoring Program () Report in order to safely prescribe medications.  Pill Counts: I consent to pill counts when requested. I may be asked to bring all my prescribed controlled substance medications, in their original bottles, to all of my scheduled appointments. In addition, my provider may ask me to come to the practice at any time for a random pill count. Initials:_______    8. TERMINATION OF THIS AGREEMENT  For my safety, my prescriber has the right to stop prescribing controlled substance medications and may end this agreement.  Conditions that may result in termination of this agreement:  a. I do not show any improvement in pain, or my activity has not improved. b. I develop rapid tolerance or loss of improvement, as described in my treatment plan.  c. I develop significant side effects from the medication. d. My behavior is not consistent with the responsibilities outlined above, thereby causing safety concerns to continue prescribing controlled substance medications. e. I fail to follow the terms of this agreement. f. Other:____________________________       UNDERSTANDING THIS MEDICATION AGREEMENT:    I have read the above and have had all my questions answered. For chronic disease management, I know that my symptoms can be managed with many types of treatments. A chronic medication trial may be part of my treatment, but I must be an active participant in my care. Medication therapy is only one part of my symptom management plan. In some cases, there may be limited scientific evidence to support the chronic use of certain medications to improve symptoms and daily function. Furthermore, in certain circumstances, there may be scientific information that suggests that the use of chronic controlled substances may worsen my symptoms and increase my risk of unintentional death directly related to this medication therapy. I know that if my provider feels my risk from controlled medications is greater than my benefit, I will have my controlled substance medication(s) compassionately lowered or removed altogether. I further agree to allow this office to contact my HIPAA contact if there are concerns about my safety and use of the controlled medications.    I have agreed to use the prescribed controlled substance medications to me as instructed by my provider and as stated in this Medication Agreement. My initial on each page and my signature below shows that I have read each page and I have had the opportunity to ask questions with answers provided by my provider.     Patient Name (Printed): _____________________________________  Patient Signature:  ______________________   Date: _____________    Prescriber Name (Printed): ___________________________________  Prescriber Signature: _____________________  Date: _____________

## 2021-06-24 NOTE — PROGRESS NOTES
Zachery Montenegro is a 61 y.o. female presenting for Follow Up Chronic Condition (6 mo fu)  . 1. Have you been to the ER, urgent care clinic since your last visit? Hospitalized since your last visit? No    2. Have you seen or consulted any other health care providers outside of the [address] [place] [address] since your last visit? Include any pap smears or colon screening. No    Fall Risk Assessment, last 12 mths 12/21/2020   Able to walk? Yes   Fall in past 12 months? No         Abuse Screening Questionnaire 12/21/2020   Do you ever feel afraid of your partner? N   Are you in a relationship with someone who physically or mentally threatens you? N   Is it safe for you to go home? Y       3 most recent PHQ Screens 6/24/2021   Little interest or pleasure in doing things Not at all   Feeling down, depressed, irritable, or hopeless Not at all   Total Score PHQ 2 0   Trouble falling or staying asleep, or sleeping too much Not at all   Feeling tired or having little energy Not at all   Poor appetite, weight loss, or overeating Not at all   Feeling bad about yourself - or that you are a failure or have let yourself or your family down Not at all   Trouble concentrating on things such as school, work, reading, or watching TV Not at all   Moving or speaking so slowly that other people could have noticed; or the opposite being so fidgety that others notice Not at all   Thoughts of being better off dead, or hurting yourself in some way Not at all   PHQ 9 Score 0   How difficult have these problems made it for you to do your work, take care of your home and get along with others Not difficult at all       There are no discontinued medications.

## 2021-06-24 NOTE — PROGRESS NOTES
Subjective:     Mrs. Cynthia Dodge returns to the office today in follow-up of multiple medical problems. The patient has hypertension currently on hydrochlorothiazide. Patient tolerates the medication however has had an occasional cramp recently. She denies any orthostatic dizziness. She has had no headaches, numbness, tingling or focal neurological problems. The patient has depression for which she is on Celexa. Her current dose of Celexa continues to work effectively for her as she has had no breakthrough symptoms or any long-term side effects related to her medication. The patient has a history of Crohn's disease currently on Pentasa. Patient is doing well with the medication without side effect. She has had no exacerbation of her Crohn's disease or changes in bowel habits. The patient has a history of idiopathic hypersomnia. This has previously been evaluated by sleep medicine and she was placed on Ritalin for treatment of this issue. The medications have continue to work effectively for her and regarding to maintaining alertness such as she is able to perform ADLs such as driving without falling asleep and she is able to work gainfully. There is been no tolerance or side effects to the medication over time. PDMP reviews have been appropriate. Past Medical History:   Diagnosis Date    Allergic rhinitis 12/20/2017    Crohn's disease ([facility] [address] 12/20/2017    Depression 12/20/2017    Essential hypertension 12/20/2017    Idiopathic hypersomnia 12/20/2017    Shift work sleep disorder 12/20/2017     Past Surgical History:   Procedure Laterality Date    HX ORTHOPAEDIC       No Known Allergies  Current Outpatient Medications   Medication Sig Dispense Refill    hydroCHLOROthiazide (HYDRODIURIL) 25 mg tablet Take 1 Tablet by mouth daily. 90 Tablet 1    methylphenidate HCl (RITALIN) 20 mg tablet Take 1 Tablet by mouth two (2) times a day.  Max Daily Amount: 40 mg. 60 Tablet 0    citalopram (CeleXA) 20 mg tablet Take 1 Tab by mouth daily. 90 Tab 2    mesalamine (PENTASA) 500 mg CR capsule Take 500 mg by mouth three (3) times daily. Social History     Socioeconomic History    Marital status:      Spouse name: Not on file    Number of children: Not on file    Years of education: Not on file    Highest education level: Not on file   Tobacco Use    Smoking status: Never Smoker    Smokeless tobacco: Never Used   Vaping Use    Vaping Use: Never used     Social Determinants of Health     Financial Resource Strain:     Difficulty of Paying Living Expenses:    Food Insecurity:     Worried About Running Out of Food in the Last Year:     [address] in the Last Year:    Transportation Needs:     Lack of Transportation (Medical):      Lack of Transportation (Non-Medical):    Physical Activity:     Days of Exercise per Week:     Minutes of Exercise per Session:    Stress:     Feeling of Stress :    Social Connections:     Frequency of Communication with Friends and Family:     Frequency of Social Gatherings with Friends and Family:     Attends [employer] orthodox Services:     Active Member of Clubs or Organizations:     Attends Club or Organization Meetings:     Marital Status:      Family History   Problem Relation Age of Onset    Hypertension Mother     Diabetes Mother     Diabetes Father     Cancer Father        Review of Systems:  GEN: no weight loss, weight gain, fatigue or night sweats  CV: no PND, orthopnea, or palpitations  Resp: no dyspnea on exertion, no cough  Abd: no nausea, vomiting or diarrhea  EXT: denies edema, claudication  Endocrine: no hair loss, excessive thirst or polyuria  Neurological ROS: no TIA or stroke symptoms  ROS otherwise negative      Objective:     Visit Vitals  /82 (BP 1 Location: Right arm, BP Patient Position: Sitting, BP Cuff Size: Adult)   Pulse 81   Temp 98.2 °F (36.8 °C) (Oral)   Resp 16   Ht 5' 6\" (1.676 m)   Wt 246 lb 3.2 oz (111.7 kg)   SpO2 98%   BMI 39.74 kg/m²     Body mass index is 39.74 kg/m². General:   alert, cooperative and no distress   Eyes: conjunctivae/sclerae clear. PERRL, EOM's intact   Mouth:  No oral lesions, no pharyngeal erythema, no exudates   Neck: Trachea midline, no thyromegaly, no bruits   Heart: S1 and S2 normal,no murmurs noted    Lungs: Clear to auscultation bilaterally, no increased work of breathing   Abdomen: Soft, nontender. Normal bowel sounds   Extremities: No edema or cyanosis   Neuro: ..alert, oriented x3,speech normal in context and clarity, cranial nerves II-XII intact,motor strength: full proximally and distally,gait: normal  reflexes: full and symmetric     Physical exam otherwise negative         Assessment/Plan:     Diagnoses and all orders for this visit:    Essential hypertension  -     COLLECTION VENOUS BLOOD,VENIPUNCTURE  -     CBC WITH AUTOMATED DIFF; Future  -     LIPID PANEL; Future  -     METABOLIC PANEL, COMPREHENSIVE; Future  -     TSH 3RD GENERATION; Future  -     URINALYSIS W/ REFLEX CULTURE; Future    Depression, unspecified depression type    Idiopathic hypersomnia    Crohn's disease without complication, unspecified gastrointestinal tract location ([address]    Obesity, morbid ([address]        Other instructions: The patient's medications were reviewed and reconciled. No change in her current medical regimen will be made. A no added salt, prudent diet is encouraged    Weight loss recommended with body mass index of 40. Controlled substance agreement is renewed today. PDMP is reviewed and appears appropriate    Await results of multiple labs    Follow-up in 6 months    Follow-up and Dispositions    · Return in about 6 months (around 12/24/2021). Brenda Virk MD    Please note that this dictation was completed with [employer], the [employer] voice recognition software.   Quite often unanticipated grammatical, syntax, homophones, and other interpretive errors are inadvertently transcribed by the computer software. Please disregard these errors. Please excuse any errors that have escaped final proofreading.

## 2021-06-27 LAB
ALBUMIN SERPL-MCNC: 4.3 G/DL (ref 3.8–4.9)
ALBUMIN/GLOB SERPL: 1.5 {RATIO} (ref 1.2–2.2)
ALP SERPL-CCNC: 124 IU/L (ref 48–121)
ALT SERPL-CCNC: 16 IU/L (ref 0–32)
APPEARANCE UR: CLEAR
AST SERPL-CCNC: 19 IU/L (ref 0–40)
BACTERIA #/AREA URNS HPF: NORMAL /[HPF]
BACTERIA UR CULT: NORMAL
BASOPHILS # BLD AUTO: 0 X10E3/UL (ref 0–0.2)
BASOPHILS NFR BLD AUTO: 0 %
BILIRUB SERPL-MCNC: 0.4 MG/DL (ref 0–1.2)
BILIRUB UR QL STRIP: NEGATIVE
BUN SERPL-MCNC: 18 MG/DL (ref 6–24)
BUN/CREAT SERPL: 20 (ref 9–23)
CALCIUM SERPL-MCNC: 10.5 MG/DL (ref 8.7–10.2)
CASTS URNS QL MICRO: NORMAL /LPF
CHLORIDE SERPL-SCNC: 102 MMOL/L (ref 96–106)
CHOLEST SERPL-MCNC: 171 MG/DL (ref 100–199)
CO2 SERPL-SCNC: 28 MMOL/L (ref 20–29)
COLOR UR: YELLOW
CREAT SERPL-MCNC: 0.9 MG/DL (ref 0.57–1)
EOSINOPHIL # BLD AUTO: 0.4 X10E3/UL (ref 0–0.4)
EOSINOPHIL NFR BLD AUTO: 5 %
EPI CELLS #/AREA URNS HPF: NORMAL /HPF (ref 0–10)
ERYTHROCYTE [DISTWIDTH] IN BLOOD BY AUTOMATED COUNT: 12.6 % (ref 11.7–15.4)
GLOBULIN SER CALC-MCNC: 2.8 G/DL (ref 1.5–4.5)
GLUCOSE SERPL-MCNC: 120 MG/DL (ref 65–99)
GLUCOSE UR QL: NEGATIVE
HCT VFR BLD AUTO: 45.6 % (ref 34–46.6)
HDLC SERPL-MCNC: 54 MG/DL
HGB BLD-MCNC: 15.1 G/DL (ref 11.1–15.9)
HGB UR QL STRIP: NEGATIVE
IMM GRANULOCYTES # BLD AUTO: 0 X10E3/UL (ref 0–0.1)
IMM GRANULOCYTES NFR BLD AUTO: 0 %
KETONES UR QL STRIP: NEGATIVE
LDLC SERPL CALC-MCNC: 101 MG/DL (ref 0–99)
LEUKOCYTE ESTERASE UR QL STRIP: ABNORMAL
LYMPHOCYTES # BLD AUTO: 2.2 X10E3/UL (ref 0.7–3.1)
LYMPHOCYTES NFR BLD AUTO: 27 %
MCH RBC QN AUTO: 32.4 PG (ref 26.6–33)
MCHC RBC AUTO-ENTMCNC: 33.1 G/DL (ref 31.5–35.7)
MCV RBC AUTO: 98 FL (ref 79–97)
MICRO URNS: ABNORMAL
MONOCYTES # BLD AUTO: 0.4 X10E3/UL (ref 0.1–0.9)
MONOCYTES NFR BLD AUTO: 5 %
NEUTROPHILS # BLD AUTO: 5 X10E3/UL (ref 1.4–7)
NEUTROPHILS NFR BLD AUTO: 63 %
NITRITE UR QL STRIP: NEGATIVE
PH UR STRIP: 5.5 [PH] (ref 5–7.5)
PLATELET # BLD AUTO: 248 X10E3/UL (ref 150–450)
POTASSIUM SERPL-SCNC: 4.3 MMOL/L (ref 3.5–5.2)
PROT SERPL-MCNC: 7.1 G/DL (ref 6–8.5)
PROT UR QL STRIP: NEGATIVE
RBC # BLD AUTO: 4.66 X10E6/UL (ref 3.77–5.28)
RBC #/AREA URNS HPF: NORMAL /HPF (ref 0–2)
SODIUM SERPL-SCNC: 142 MMOL/L (ref 134–144)
SP GR UR: 1.03 (ref 1–1.03)
TRIGL SERPL-MCNC: 89 MG/DL (ref 0–149)
TSH SERPL DL<=0.005 MIU/L-ACNC: 3.19 UIU/ML (ref 0.45–4.5)
URINALYSIS REFLEX, 377202: ABNORMAL
UROBILINOGEN UR STRIP-MCNC: 0.2 MG/DL (ref 0.2–1)
VLDLC SERPL CALC-MCNC: 16 MG/DL (ref 5–40)
WBC # BLD AUTO: 8.1 X10E3/UL (ref 3.4–10.8)
WBC #/AREA URNS HPF: NORMAL /HPF (ref 0–5)

## 2021-07-13 DIAGNOSIS — G47.11 IDIOPATHIC HYPERSOMNIA: ICD-10-CM

## 2021-07-13 RX ORDER — METHYLPHENIDATE HYDROCHLORIDE 20 MG/1
20 TABLET ORAL 2 TIMES DAILY
Qty: 60 TABLET | Refills: 0 | Status: SHIPPED | OUTPATIENT
Start: 2021-07-13 | End: 2021-08-21 | Stop reason: SDUPTHER

## 2021-08-21 DIAGNOSIS — G47.11 IDIOPATHIC HYPERSOMNIA: ICD-10-CM

## 2021-08-23 RX ORDER — METHYLPHENIDATE HYDROCHLORIDE 20 MG/1
20 TABLET ORAL 2 TIMES DAILY
Qty: 60 TABLET | Refills: 0 | Status: SHIPPED | OUTPATIENT
Start: 2021-08-23 | End: 2021-10-14 | Stop reason: SDUPTHER

## 2021-08-23 NOTE — TELEPHONE ENCOUNTER
Last Refill: 7-13-21  Last Visit: 6/24/2021   Next Visit: 12/28/2021     Requested Prescriptions     Pending Prescriptions Disp Refills    methylphenidate HCl (RITALIN) 20 mg tablet 60 Tablet 0     Sig: Take 1 Tablet by mouth two (2) times a day. Max Daily Amount: 40 mg.

## 2021-09-02 ENCOUNTER — OFFICE VISIT (OUTPATIENT)
Dept: INTERNAL MEDICINE CLINIC | Age: 56
End: 2021-09-02
Payer: COMMERCIAL

## 2021-09-02 VITALS
DIASTOLIC BLOOD PRESSURE: 80 MMHG | OXYGEN SATURATION: 96 % | BODY MASS INDEX: 39.37 KG/M2 | RESPIRATION RATE: 18 BRPM | WEIGHT: 245 LBS | TEMPERATURE: 98.6 F | SYSTOLIC BLOOD PRESSURE: 138 MMHG | HEART RATE: 105 BPM | HEIGHT: 66 IN

## 2021-09-02 DIAGNOSIS — H61.23 CERUMEN DEBRIS ON TYMPANIC MEMBRANE OF BOTH EARS: ICD-10-CM

## 2021-09-02 DIAGNOSIS — J30.2 SEASONAL ALLERGIC RHINITIS, UNSPECIFIED TRIGGER: ICD-10-CM

## 2021-09-02 DIAGNOSIS — H93.8X1 EAR FULLNESS, RIGHT: ICD-10-CM

## 2021-09-02 DIAGNOSIS — H81.11 BENIGN PAROXYSMAL POSITIONAL VERTIGO OF RIGHT EAR: Primary | ICD-10-CM

## 2021-09-02 PROCEDURE — 99214 OFFICE O/P EST MOD 30 MIN: CPT | Performed by: INTERNAL MEDICINE

## 2021-09-02 RX ORDER — MECLIZINE HYDROCHLORIDE 25 MG/1
25 TABLET ORAL
Qty: 30 TABLET | Refills: 0 | Status: SHIPPED | OUTPATIENT
Start: 2021-09-02 | End: 2021-09-12

## 2021-09-02 NOTE — PROGRESS NOTES
Annette Maloney is a 61 y.o. female and presents with Dizziness      Subjective:  Patient comes in today for acute care visit. She reports in the past 2 days she developed acute onset right-sided ear fullness, headache and dizziness. It is mainly positional in nature. It was pretty much consistent entire day Tuesday however yesterday and today it is better. I last for around a few minutes and then alleviated by itself. Laying down still in bed makes it better. She has not tried anything for symptoms. She denies postnasal drip, ear pain, ringing sensation, decreased hearing, sore throat no upper respiratory tract symptoms, nausea vomiting. Past Medical History:   Diagnosis Date    Allergic rhinitis 12/20/2017    Crohn's disease ([facility] [address] 12/20/2017    Depression 12/20/2017    Essential hypertension 12/20/2017    Idiopathic hypersomnia 12/20/2017    Shift work sleep disorder 12/20/2017     Past Surgical History:   Procedure Laterality Date    HX ORTHOPAEDIC       No Known Allergies  Current Outpatient Medications   Medication Sig Dispense Refill    meclizine (ANTIVERT) 25 mg tablet Take 1 Tablet by mouth three (3) times daily as needed for Dizziness for up to 10 days. 30 Tablet 0    carbamide peroxide (DEBROX) 6.5 % otic solution Administer 5 Drops into each ear two (2) times a day for 3 days. 1 mL 0    methylphenidate HCl (RITALIN) 20 mg tablet Take 1 Tablet by mouth two (2) times a day. Max Daily Amount: 40 mg. 60 Tablet 0    hydroCHLOROthiazide (HYDRODIURIL) 25 mg tablet Take 1 Tablet by mouth daily. 90 Tablet 1    citalopram (CeleXA) 20 mg tablet Take 1 Tab by mouth daily. 90 Tab 2    mesalamine (PENTASA) 500 mg CR capsule Take 500 mg by mouth three (3) times daily.        Social History     Socioeconomic History    Marital status:      Spouse name: Not on file    Number of children: Not on file    Years of education: Not on file    Highest education level: Not on file   Tobacco Use    Smoking status: Never Smoker    Smokeless tobacco: Never Used   Vaping Use    Vaping Use: Never used     Social Determinants of Health     Financial Resource Strain:     Difficulty of Paying Living Expenses:    Food Insecurity:     Worried About Running Out of Food in the Last Year:     [address] in the Last Year:    Transportation Needs:     Lack of Transportation (Medical):      Lack of Transportation (Non-Medical):    Physical Activity:     Days of Exercise per Week:     Minutes of Exercise per Session:    Stress:     Feeling of Stress :    Social Connections:     Frequency of Communication with Friends and Family:     Frequency of Social Gatherings with Friends and Family:     Attends Worship Services:     Active Member of Clubs or Organizations:     Attends Club or Organization Meetings:     Marital Status:      Family History   Problem Relation Age of Onset    Hypertension Mother     Diabetes Mother     Diabetes Father     Cancer Father        Objective:  Visit Vitals  /80   Pulse (!) 105   Temp 98.6 °F (37 °C) (Oral)   Resp 18   Ht 5' 6\" (1.676 m)   Wt 245 lb (111.1 kg)   SpO2 96%   BMI 39.54 kg/m²     Physical Exam:   General appearance - alert, well appearing, and in no distress  Mental status - alert, oriented to person, place, and time  EYE-ANA MARIA, EOMI, fundi normal, corneas normal, no foreign bodies  ENT-ENT exam normal, no neck nodes or sinus tenderness  Nose - normal and patent, no erythema, discharge or polyps  Mouth - mucous membranes moist, pharynx normal without lesions  Neck - supple, no significant adenopathy   Chest - clear to auscultation, no wheezes, rales or rhonchi, symmetric air entry   Heart - normal rate, regular rhythm, normal S1, S2, no murmurs, rubs, clicks or gallops   Abdomen - soft, nontender, nondistended, no masses or organomegaly  Lymph- no adenopathy palpable  Ext-peripheral pulses normal, no pedal edema, no clubbing or cyanosis  Skin-Warm and dry. no hyperpigmentation, vitiligo, or suspicious lesions  Neuro -alert, oriented, normal speech, no focal findings or movement disorder noted  Musculoskeletal- FROM, no bony abnormalities, no point tenderness    Lab Review:  Results for orders placed or performed in visit on 06/24/21   URINALYSIS W/ REFLEX CULTURE    Specimen: Urine   Result Value Ref Range    Specific Gravity 1.026 1.005 - 1.030    pH (UA) 5.5 5.0 - 7.5    Color Yellow Yellow    Appearance Clear Clear    Leukocyte Esterase 1+ (A) Negative    Protein Negative Negative/Trace    Glucose Negative Negative    Ketone Negative Negative    Blood Negative Negative    Bilirubin Negative Negative    Urobilinogen 0.2 0.2 - 1.0 mg/dL    Nitrites Negative Negative    Microscopic Examination See additional order     URINALYSIS REFLEX Comment    TSH 3RD GENERATION   Result Value Ref Range    TSH 3.190 0.450 - 5.697 uIU/mL   METABOLIC PANEL, COMPREHENSIVE   Result Value Ref Range    Glucose 120 (H) 65 - 99 mg/dL    BUN 18 6 - 24 mg/dL    Creatinine 0.90 0.57 - 1.00 mg/dL    GFR est non-AA 71 >59 mL/min/1.73    GFR est AA 82 >59 mL/min/1.73    BUN/Creatinine ratio 20 9 - 23    Sodium 142 134 - 144 mmol/L    Potassium 4.3 3.5 - 5.2 mmol/L    Chloride 102 96 - 106 mmol/L    CO2 28 20 - 29 mmol/L    Calcium 10.5 (H) 8.7 - 10.2 mg/dL    Protein, total 7.1 6.0 - 8.5 g/dL    Albumin 4.3 3.8 - 4.9 g/dL    GLOBULIN, TOTAL 2.8 1.5 - 4.5 g/dL    A-G Ratio 1.5 1.2 - 2.2    Bilirubin, total 0.4 0.0 - 1.2 mg/dL    Alk.  phosphatase 124 (H) 48 - 121 IU/L    AST (SGOT) 19 0 - 40 IU/L    ALT (SGPT) 16 0 - 32 IU/L   LIPID PANEL   Result Value Ref Range    Cholesterol, total 171 100 - 199 mg/dL    Triglyceride 89 0 - 149 mg/dL    HDL Cholesterol 54 >39 mg/dL    VLDL, calculated 16 5 - 40 mg/dL    LDL, calculated 101 (H) 0 - 99 mg/dL   CBC WITH AUTOMATED DIFF   Result Value Ref Range    WBC 8.1 3.4 - 10.8 x10E3/uL    RBC 4.66 3.77 - 5.28 x10E6/uL    HGB 15.1 11.1 - 15.9 g/dL    HCT 45.6 34.0 - 46.6 %    MCV 98 (H) 79 - 97 fL    MCH 32.4 26.6 - 33.0 pg    MCHC 33.1 31.5 - 35.7 g/dL    RDW 12.6 11.7 - 15.4 %    PLATELET 541 986 - 493 x10E3/uL    NEUTROPHILS 63 Not Estab. %    Lymphocytes 27 Not Estab. %    MONOCYTES 5 Not Estab. %    EOSINOPHILS 5 Not Estab. %    BASOPHILS 0 Not Estab. %    ABS. NEUTROPHILS 5.0 1.4 - 7.0 x10E3/uL    Abs Lymphocytes 2.2 0.7 - 3.1 x10E3/uL    ABS. MONOCYTES 0.4 0.1 - 0.9 x10E3/uL    ABS. EOSINOPHILS 0.4 0.0 - 0.4 x10E3/uL    ABS. BASOPHILS 0.0 0.0 - 0.2 x10E3/uL    IMMATURE GRANULOCYTES 0 Not Estab. %    ABS. IMM. GRANS. 0.0 0.0 - 0.1 x10E3/uL   MICROSCOPIC EXAMINATION   Result Value Ref Range    WBC 0-5 0 - 5 /hpf    RBC 0-2 0 - 2 /hpf    Epithelial cells 0-10 0 - 10 /hpf    Casts None seen None seen /lpf    Bacteria Few None seen/Few   URINE CULTURE, ROUTINE   Result Value Ref Range    Urine Culture, Routine       Mixed urogenital jyoti  10,000-25,000 colony forming units per mL          Documenation Review:    Assessment/Plan:    Diagnoses and all orders for this visit:    1. Benign paroxysmal positional vertigo of right ear  -     meclizine (ANTIVERT) 25 mg tablet; Take 1 Tablet by mouth three (3) times daily as needed for Dizziness for up to 10 days. 2. Ear fullness, right    3. Seasonal allergic rhinitis, unspecified trigger    4. Cerumen debris on tympanic membrane of both ears  -     carbamide peroxide (DEBROX) 6.5 % otic solution; Administer 5 Drops into each ear two (2) times a day for 3 days. Meclizine as needed for vertigo. Vertigo exercises provided to patient. Debrox eardrops for wax in bilateral (left greater than right)      ICD-10-CM ICD-9-CM    1. Benign paroxysmal positional vertigo of right ear  H81.11 386.11 meclizine (ANTIVERT) 25 mg tablet   2. Ear fullness, right  H93.8X1 388.8    3. Seasonal allergic rhinitis, unspecified trigger  J30.2 477.9    4.  Cerumen debris on tympanic membrane of both ears  H61.23 380.4 carbamide peroxide (DEBROX) 6.5 % otic solution               I have reviewed with the patient details of the assessment and plan and all questions were answered. Relevent patient education was performed. Verbal and/or written instructions (see AVS) provided. The most recent lab findings were reviewed with the patient. Plan was discussed with patient who verbally expressed understanding. An After Visit Summary was printed and given to the patient.     James Lee MD

## 2021-09-02 NOTE — PATIENT INSTRUCTIONS
Epley Maneuver for Vertigo: Exercises  Introduction  The Epley maneuver is a series of movements your doctor may use to treat your vertigo. Here are the steps for the exercises. Your doctor or physical therapist will guide you through the movements. A single 10- to 15-minute session often is all that's needed. Crystal debris (canaliths) cause the vertigo. When your head is moved into different positions, the debris moves freely. This may cause your symptoms to stop. How to do the exercises  Step 1   1. You will sit on the doctor's exam table. Your legs will be out in front of you. The doctor or physical therapist will turn your head so that it is alf between looking straight ahead and looking to the side that causes the worst vertigo. 2. Without changing your head position, he or she will guide you back quickly. Your shoulders will be on the table. Your head will hang over the edge of the table. At this point, the side of your head that is causing the worst vertigo will face the floor. You'll stay in this position for 30 seconds or until your symptoms stop. Step 2   1. Then, the doctor or physical therapist will turn your head to the other side. You don't need to lift your head. The other side of your head will face the floor. You will stay in this position for 30 seconds or until your symptoms stop. Step 3   1. The doctor or physical therapist will help you roll your body in the same direction that your head is facing. You will lie on your side. (For example, if you are looking to your right, you will roll onto your right side.) The side that causes the worst symptoms should be facing up. You'll stay in this position for another 30 seconds or until your symptoms stop. Step 4   1. The doctor or physical therapist will then help you to sit back up. Your legs will hang off the table on the same side that you were facing. Follow-up care is a key part of your treatment and safety.  Be sure to make and go to all appointments, and call your doctor if you are having problems. It's also a good idea to know your test results and keep a list of the medicines you take. Where can you learn more? Go to http://www.gray.com/  Enter A855 in the search box to learn more about \"Epley Maneuver for Vertigo: Exercises. \"  Current as of: December 2, 2020               Content Version: 12.8  © 2006-2021 [employer]. Care instructions adapted under license by [employer] (which disclaims liability or warranty for this information). If you have questions about a medical condition or this instruction, always ask your healthcare professional. [address] any warranty or liability for your use of this information. Cawthorne Exercises for Vertigo: Care Instructions  Your Care Instructions  Simple exercises can help you regain your balance when you have vertigo. If you have Ménière's disease, benign paroxysmal positional vertigo (BPPV), or another inner ear problem, you may have vertigo off and on. Do these exercises first thing in the morning and before you go to bed. You might get dizzy when you first start them. If this happens, try to do them for at least 5 minutes. Do a group of exercises at a time, starting at the top of the list. It may take several weeks before you can do all the exercises without feeling dizzy. Follow-up care is a key part of your treatment and safety. Be sure to make and go to all appointments, and call your doctor if you are having problems. It's also a good idea to know your test results and keep a list of the medicines you take. How can you care for yourself at home? Exercise 1  While sitting on the side of the bed and holding your head still:  · Look up as far as you can. · Look down as far as you can. · Look from side to side as far as you can. · Stretch your arm straight out in front of you.  Focus on your index finger. Continue to focus on your finger while you bring it to your nose. Exercise 2  While sitting on the side of the bed:  · Bring your head as far back as you can. · Bring your head forward to touch your chin to your chest.  · Turn your head from side to side. · Do these exercises first with your eyes open. Then try with your eyes closed. Exercise 3  While sitting on the side of the bed:  · Shrug your shoulders straight upward, then relax them. · Bend over and try to touch the ground with your fingers. Then go back to a sitting position. · Toss a small ball from one hand to the other. Throw the ball higher than your eyes so you have to look up. Exercise 4  While standing (with someone close by if you feel uncomfortable):  · Repeat Exercise 1.  · Repeat Exercise 2.  · Pass a ball between your legs and above your head. · Sit down and then stand up. Repeat. Turn around in a [place] a different way each time you stand. · With someone close by to help you, try the above exercises with your eyes closed. Exercise 5  In a room that is cleared of obstacles:  · Walk to a corner of the room, turn to your right, and walk back to the starting point. Now, repeat and turn left. · Walk up and down a slope. Now try stairs. · While holding on to someone's arm, try these exercises with your eyes closed. When should you call for help? Watch closely for changes in your health, and be sure to contact your doctor if:    · You do not get better as expected. Where can you learn more? Go to http://www.gray.com/  Enter A743 in the search box to learn more about \"Cawthorne Exercises for Vertigo: Care Instructions. \"  Current as of: December 2, 2020               Content Version: 12.8  © 9498-1353 [employer]. Care instructions adapted under license by [employer] (which disclaims liability or warranty for this information).  If you have questions about a medical condition or this instruction, always ask your healthcare professional. [place] [address] any warranty or liability for your use of this information.

## 2021-09-02 NOTE — PROGRESS NOTES
Yumiko Kong is a 61 y.o. female  HIPAA verified by two patient identifiers. Health Maintenance Due   Topic    Breast Cancer Screen Mammogram     PAP AKA CERVICAL CYTOLOGY     Flu Vaccine (1)     Chief Complaint   Patient presents with    Dizziness     Visit Vitals  /80   Pulse (!) 105   Temp 98.6 °F (37 °C) (Oral)   Resp 18   Ht 5' 6\" (1.676 m)   Wt 245 lb (111.1 kg)   SpO2 96%   BMI 39.54 kg/m²       Pain Scale: 0 - No pain/10  Pain Location:   1. Have you been to the ER, urgent care clinic since your last visit? Hospitalized since your last visit? No    2. Have you seen or consulted any other health care providers outside of the [address] [place] [address] since your last visit? Include any pap smears or colon screening.  No

## 2021-09-02 NOTE — LETTER
NOTIFICATION RETURN TO WORK / SCHOOL    9/2/2021 2:40 PM    Ms. Renu Corrales  [address] [facility]      To Whom It May Concern:    Renu Corrales is currently under the care of [address]. She will return to work/school on: 9/5/21    If there are questions or concerns please have the patient contact our office.         Sincerely,      Verenice Bergeron MD

## 2021-10-14 DIAGNOSIS — G47.11 IDIOPATHIC HYPERSOMNIA: ICD-10-CM

## 2021-10-14 RX ORDER — METHYLPHENIDATE HYDROCHLORIDE 20 MG/1
20 TABLET ORAL 2 TIMES DAILY
Qty: 60 TABLET | Refills: 0 | Status: SHIPPED | OUTPATIENT
Start: 2021-10-14 | End: 2021-11-29 | Stop reason: SDUPTHER

## 2021-10-14 NOTE — TELEPHONE ENCOUNTER
Last Refill: 8-23-21  Last Visit: 6/24/2021   Next Visit: 12/28/2021     Requested Prescriptions     Pending Prescriptions Disp Refills    methylphenidate HCl (RITALIN) 20 mg tablet 60 Tablet 0     Sig: Take 1 Tablet by mouth two (2) times a day. Max Daily Amount: 40 mg.

## 2021-11-29 DIAGNOSIS — G47.11 IDIOPATHIC HYPERSOMNIA: ICD-10-CM

## 2021-11-29 RX ORDER — METHYLPHENIDATE HYDROCHLORIDE 20 MG/1
20 TABLET ORAL 2 TIMES DAILY
Qty: 60 TABLET | Refills: 0 | Status: SHIPPED | OUTPATIENT
Start: 2021-11-29 | End: 2022-01-09 | Stop reason: SDUPTHER

## 2021-11-29 NOTE — TELEPHONE ENCOUNTER
Last Refill: 10-14-21  Last Visit: 6/24/2021   Next Visit: 12/28/2021     Requested Prescriptions     Pending Prescriptions Disp Refills    methylphenidate HCl (RITALIN) 20 mg tablet 60 Tablet 0     Sig: Take 1 Tablet by mouth two (2) times a day. Max Daily Amount: 40 mg.

## 2021-12-27 RX ORDER — HYDROCHLOROTHIAZIDE 25 MG/1
25 TABLET ORAL DAILY
Qty: 90 TABLET | Refills: 1 | Status: SHIPPED | OUTPATIENT
Start: 2021-12-27 | End: 2022-07-15 | Stop reason: SDUPTHER

## 2021-12-27 NOTE — TELEPHONE ENCOUNTER
PCP: Sera Pineda MD    Last appt: Visit date not found  Future Appointments   Date Time Provider Jose Mina   12/28/2021  3:00 PM Mehran Terrell MD [facility]       Requested Prescriptions     Pending Prescriptions Disp Refills    hydroCHLOROthiazide (HYDRODIURIL) 25 mg tablet 90 Tablet 1     Sig: Take 1 Tablet by mouth daily.        Prior labs and Blood pressures:  BP Readings from Last 3 Encounters:   09/02/21 138/80   06/24/21 124/82   12/21/20 128/76     Lab Results   Component Value Date/Time    Sodium 142 06/24/2021 12:00 AM    Potassium 4.3 06/24/2021 12:00 AM    Chloride 102 06/24/2021 12:00 AM    CO2 28 06/24/2021 12:00 AM    Glucose 120 (H) 06/24/2021 12:00 AM    BUN 18 06/24/2021 12:00 AM    Creatinine 0.90 06/24/2021 12:00 AM    BUN/Creatinine ratio 20 06/24/2021 12:00 AM    GFR est AA 82 06/24/2021 12:00 AM    GFR est non-AA 71 06/24/2021 12:00 AM    Calcium 10.5 (H) 06/24/2021 12:00 AM     No results found for: HBA1C, XYS4KAET, [id number]  Lab Results   Component Value Date/Time    Cholesterol, total 171 06/24/2021 12:00 AM    HDL Cholesterol 54 06/24/2021 12:00 AM    LDL, calculated 101 (H) 06/24/2021 12:00 AM    LDL, calculated 87 06/08/2020 09:18 AM    VLDL, calculated 16 06/24/2021 12:00 AM    VLDL, calculated 16 06/08/2020 09:18 AM    Triglyceride 89 06/24/2021 12:00 AM     No results found for: CLARY Montalvo    Lab Results   Component Value Date/Time    TSH 3.190 06/24/2021 12:00 AM

## 2021-12-28 ENCOUNTER — OFFICE VISIT (OUTPATIENT)
Dept: INTERNAL MEDICINE CLINIC | Age: 56
End: 2021-12-28
Payer: COMMERCIAL

## 2021-12-28 VITALS
HEART RATE: 104 BPM | RESPIRATION RATE: 18 BRPM | SYSTOLIC BLOOD PRESSURE: 134 MMHG | HEIGHT: 66 IN | OXYGEN SATURATION: 96 % | BODY MASS INDEX: 39.79 KG/M2 | DIASTOLIC BLOOD PRESSURE: 87 MMHG | WEIGHT: 247.6 LBS | TEMPERATURE: 98.7 F

## 2021-12-28 DIAGNOSIS — K50.90 CROHN'S DISEASE WITHOUT COMPLICATION, UNSPECIFIED GASTROINTESTINAL TRACT LOCATION (HCC): ICD-10-CM

## 2021-12-28 DIAGNOSIS — I10 ESSENTIAL HYPERTENSION: Primary | Chronic | ICD-10-CM

## 2021-12-28 DIAGNOSIS — Z23 NEEDS FLU SHOT: ICD-10-CM

## 2021-12-28 DIAGNOSIS — E66.01 OBESITY, MORBID (HCC): ICD-10-CM

## 2021-12-28 DIAGNOSIS — F32.A DEPRESSION, UNSPECIFIED DEPRESSION TYPE: ICD-10-CM

## 2021-12-28 DIAGNOSIS — G47.11 IDIOPATHIC HYPERSOMNIA: ICD-10-CM

## 2021-12-28 PROCEDURE — 99214 OFFICE O/P EST MOD 30 MIN: CPT | Performed by: INTERNAL MEDICINE

## 2021-12-28 NOTE — PROGRESS NOTES
Subjective:     Mrs. Alicia Velazquez returns to the office follow-up of multiple medical problems. The patient has hypertension currently managed on hydrochlorothiazide. She tolerates this without orthostatic dizziness, muscle cramping. She has had no headaches, numbness, tingling or focal neurological problems. Depression is currently managed on this. The patient has taken his long-term and reports no breakthrough symptoms or long-term side effects related to her medication. She has Crohn's disease and is chronically on mesalamine. She did have a slight flare of her Crohn's on Jourdan shamir but this resolved quickly. She denies any abdominal pain or blood in her stool. She continues to be treated for idiopathic hypersomnia with Ritalin. This continues to work effectively for her allowing her to do her ADLs and to work gainfully. There is been no tolerance to her medication over time. Past Medical History:   Diagnosis Date    Allergic rhinitis 12/20/2017    Crohn's disease ([address] 12/20/2017    Depression 12/20/2017    Essential hypertension 12/20/2017    Idiopathic hypersomnia 12/20/2017    Shift work sleep disorder 12/20/2017     Past Surgical History:   Procedure Laterality Date    HX ORTHOPAEDIC       No Known Allergies  Current Outpatient Medications   Medication Sig Dispense Refill    hydroCHLOROthiazide (HYDRODIURIL) 25 mg tablet Take 1 Tablet by mouth daily. 90 Tablet 1    methylphenidate HCl (RITALIN) 20 mg tablet Take 1 Tablet by mouth two (2) times a day. Max Daily Amount: 40 mg. 60 Tablet 0    citalopram (CeleXA) 20 mg tablet Take 1 Tab by mouth daily. 90 Tab 2    mesalamine (PENTASA) 500 mg CR capsule Take 500 mg by mouth three (3) times daily.        Social History     Socioeconomic History    Marital status:    Tobacco Use    Smoking status: Never Smoker    Smokeless tobacco: Never Used   Vaping Use    Vaping Use: Never used     Family History   Problem Relation Age of Onset    Hypertension Mother     Diabetes Mother     Diabetes Father     Cancer Father        Review of Systems:  GEN: no weight loss, weight gain, fatigue or night sweats  CV: no PND, orthopnea, or palpitations  Resp: no dyspnea on exertion, no cough  Abd: no nausea, vomiting or diarrhea  EXT: denies edema, claudication  Endocrine: no hair loss, excessive thirst or polyuria  Neurological ROS: no TIA or stroke symptoms  ROS otherwise negative      Objective:     Visit Vitals  /87 (BP 1 Location: Left upper arm, BP Patient Position: Sitting, BP Cuff Size: Large adult)   Pulse (!) 104   Temp 98.7 °F (37.1 °C) (Oral)   Resp 18   Ht 5' 6\" (1.676 m)   Wt 247 lb 9.6 oz (112.3 kg)   SpO2 96%   BMI 39.96 kg/m²     Body mass index is 39.96 kg/m². General:   alert, cooperative and no distress   Eyes: conjunctivae/sclerae clear. PERRL, EOM's intact   Mouth:  No oral lesions, no pharyngeal erythema, no exudates   Neck: Trachea midline, no thyromegaly, no bruits   Heart: S1 and S2 normal,no murmurs noted    Lungs: Clear to auscultation bilaterally, no increased work of breathing   Abdomen: Soft, nontender. Normal bowel sounds   Extremities: No edema or cyanosis   Neuro: ..alert, oriented x3,speech normal in context and clarity, cranial nerves II-XII intact,motor strength: full proximally and distally,gait: normal  reflexes: full and symmetric     Physical exam otherwise negative         Assessment/Plan:     Diagnoses and all orders for this visit:    Essential hypertension    Crohn's disease without complication, unspecified gastrointestinal tract location ([facility] [address]    Depression, unspecified depression type    Idiopathic hypersomnia    Obesity, morbid ([facility] [address]    Needs flu shot  -     INFLUENZA VIRUS VAC QUAD,SPLIT,PRESV FREE SYRINGE IM        Other instructions: The patient's medications were reviewed and reconciled. No change in her current medical regimen will be made.     A no added salt, prudent diet is encouraged    Weight loss recommended with body mass index of 39.54    Labs from 6/24 were reviewed during the course of this office visit    An influenza vaccination will be administered today if he DMP is reviewed and appears appropriate    Follow-up in 6 months    Follow-up and Dispositions    · Return in about 6 months (around 6/28/2022). Lena Cleveland MD    Please note that this dictation was completed with Mindie, the computer voice recognition software. Quite often unanticipated grammatical, syntax, homophones, and other interpretive errors are inadvertently transcribed by the computer software. Please disregard these errors. Please excuse any errors that have escaped final proofreading. 12/29/2021  Addendum:  During the course of yesterday's office visit a regular quadrivalent influenza vaccination was ordered. The patient erroneously received the Fluad influenza vaccination. The order for the quadrivalent influenza vaccination will subsequently be canceled and information in regards to the fluid and vaccination will be entered manually by nursing. 12/29/2021  Addendum 3:15 PM  Talked with patient by phone and informed her that she had received the Fluad vaccine rather than the regular quadrivalent vaccine. She has had no side effects related to the vaccine given yesterday. Patient understands the difference between the 2 vaccines and stated that she had no concerns about receiving the Fluad vaccine. She thanked me for calling to let her know. She understands that if she has any concerns about the vaccine that she can call me at any time.

## 2021-12-28 NOTE — PROGRESS NOTES
HIPAA verified by two patient identifiers. Kaylin Pace is a 61 y.o. female    Chief Complaint   Patient presents with    Hypertension     6 months f/u    Depression    Inflammatory Bowel Disease       Visit Vitals  /87 (BP 1 Location: Left upper arm, BP Patient Position: Sitting, BP Cuff Size: Large adult)   Pulse (!) 104   Temp 98.7 °F (37.1 °C) (Oral)   Resp 18   Ht 5' 6\" (1.676 m)   Wt 247 lb 9.6 oz (112.3 kg)   SpO2 96%   BMI 39.96 kg/m²       Pain Scale: 0 - No pain/10  Pain Location:       Health Maintenance Due   Topic Date Due    Cervical cancer screen  Never done    Breast Cancer Screen Mammogram  07/09/2020    Flu Vaccine (1) 09/01/2021    COVID-19 Vaccine (3 - Booster for Pfizer series) 09/03/2021         Coordination of Care Questionnaire:  :   1) Have you been to an emergency room, urgent care, or hospitalized since your last visit? If yes, where when, and reason for visit? no       2. Have seen or consulted any other health care provider since your last visit? If yes, where when, and reason for visit? NO      Patient is accompanied by self I have received verbal consent from Kaylin Pace to discuss any/all medical information while they are present in the room.

## 2021-12-28 NOTE — PROGRESS NOTES
Rosangela Charles a 61 y.o. female who is present for routine immunizations. Prior to vaccine administration After obtaining verbal consent and per orders of Dr. Marquis Yanez, injection of flu shot in left deltoid given by Alonso Mccabe. Risks and adverse reactions were discussed. The patient was provided the VIS and they were given an opportunity to ask questions, all questions addressed. Patient tolerated procedure well. No reactions noted. Patient was advised to seek medical or call the office with any questions or concerns post vaccination. Patient verbalized understanding.  Alonso Garciamarian     Lot # [id number], expires may 24th 2022, ndc [id number]

## 2021-12-28 NOTE — PATIENT INSTRUCTIONS
DASH Diet: Care Instructions  Your Care Instructions     The DASH diet is an eating plan that can help lower your blood pressure. DASH stands for Dietary Approaches to Stop Hypertension. Hypertension is high blood pressure. The DASH diet focuses on eating foods that are high in calcium, potassium, and magnesium. These nutrients can lower blood pressure. The foods that are highest in these nutrients are fruits, vegetables, low-fat dairy products, nuts, seeds, and legumes. But taking calcium, potassium, and magnesium supplements instead of eating foods that are high in those nutrients does not have the same effect. The DASH diet also includes whole grains, fish, and poultry. The DASH diet is one of several lifestyle changes your doctor may recommend to lower your high blood pressure. Your doctor may also want you to decrease the amount of sodium in your diet. Lowering sodium while following the DASH diet can lower blood pressure even further than just the DASH diet alone. Follow-up care is a key part of your treatment and safety. Be sure to make and go to all appointments, and call your doctor if you are having problems. It's also a good idea to know your test results and keep a list of the medicines you take. How can you care for yourself at home? Following the DASH diet  · Eat 4 to 5 servings of fruit each day. A serving is 1 medium-sized piece of fruit, ½ cup chopped or canned fruit, 1/4 cup dried fruit, or 4 ounces (½ cup) of fruit juice. Choose fruit more often than fruit juice. · Eat 4 to 5 servings of vegetables each day. A serving is 1 cup of lettuce or raw leafy vegetables, ½ cup of chopped or cooked vegetables, or 4 ounces (½ cup) of vegetable juice. Choose vegetables more often than vegetable juice. · Get 2 to 3 servings of low-fat and fat-free dairy each day. A serving is 8 ounces of milk, 1 cup of yogurt, or 1 ½ ounces of cheese. · Eat 6 to 8 servings of grains each day.  A serving is 1 slice of bread, 1 ounce of dry cereal, or ½ cup of cooked rice, pasta, or cooked cereal. Try to choose whole-grain products as much as possible. · Limit lean meat, poultry, and fish to 2 servings each day. A serving is 3 ounces, about the size of a deck of cards. · Eat 4 to 5 servings of nuts, seeds, and legumes (cooked dried beans, lentils, and split peas) each week. A serving is 1/3 cup of nuts, 2 tablespoons of seeds, or ½ cup of cooked beans or peas. · Limit fats and oils to 2 to 3 servings each day. A serving is 1 teaspoon of vegetable oil or 2 tablespoons of salad dressing. · Limit sweets and added sugars to 5 servings or less a week. A serving is 1 tablespoon jelly or jam, ½ cup sorbet, or 1 cup of lemonade. · Eat less than 2,300 milligrams (mg) of sodium a day. If you limit your sodium to 1,500 mg a day, you can lower your blood pressure even more. · Be aware that all of these are the suggested number of servings for people who eat 1,800 to 2,000 calories a day. Your recommended number of servings may be different if you need more or fewer calories. Tips for success  · Start small. Do not try to make dramatic changes to your diet all at once. You might feel that you are missing out on your favorite foods and then be more likely to not follow the plan. Make small changes, and stick with them. Once those changes become habit, add a few more changes. · Try some of the following:  ? Make it a goal to eat a fruit or vegetable at every meal and at snacks. This will make it easy to get the recommended amount of fruits and vegetables each day. ? Try yogurt topped with fruit and nuts for a snack or healthy dessert. ? Add lettuce, tomato, cucumber, and onion to sandwiches. ? Combine a ready-made pizza crust with low-fat mozzarella cheese and lots of vegetable toppings. Try using tomatoes, squash, spinach, broccoli, carrots, cauliflower, and onions. ?  Have a variety of cut-up vegetables with a low-fat dip as an appetizer instead of chips and dip. ? Sprinkle sunflower seeds or chopped almonds over salads. Or try adding chopped walnuts or almonds to cooked vegetables. ? Try some vegetarian meals using beans and peas. Add garbanzo or kidney beans to salads. Make burritos and tacos with mashed marte beans or black beans. Where can you learn more? Go to http://www.miranda.com/  Enter H967 in the search box to learn more about \"DASH Diet: Care Instructions. \"  Current as of: April 29, 2021               Content Version: 13.0  © 8092-0529 [employer]. Care instructions adapted under license by [employer] (which disclaims liability or warranty for this information). If you have questions about a medical condition or this instruction, always ask your healthcare professional. [place] [address] any warranty or liability for your use of this information. Vaccine Information Statement    Influenza (Flu) Vaccine (Inactivated or Recombinant): What You Need to Know    Many vaccine information statements are available in Latvian and other languages. See www.immunize.org/vis. Hojas de información sobre vacunas están disponibles en español y en muchos otros idiomas. Visite www.immunize.org/vis. 1. Why get vaccinated? Influenza vaccine can prevent influenza (flu). Flu is a contagious disease that spreads around the [place] every year, usually between October and May. Anyone can get the flu, but it is more dangerous for some people. Infants and young children, people 72 years and older, pregnant people, and people with certain health conditions or a weakened immune system are at greatest risk of flu complications. Pneumonia, bronchitis, sinus infections, and ear infections are examples of flu-related complications. If you have a medical condition, such as heart disease, cancer, or diabetes, flu can make it worse.     Flu can cause fever and chills, sore throat, muscle aches, fatigue, cough, headache, and runny or stuffy nose. Some people may have vomiting and diarrhea, though this is more common in children than adults. In an average year, thousands of people in the [facility] die from flu, and many more are hospitalized. Flu vaccine prevents millions of illnesses and flu-related visits to the doctor each year. 2. Influenza vaccines     CDC recommends everyone 6 months and older get vaccinated every flu season. Children 6 months through 6years of age may need 2 doses during a single flu season. Everyone else needs only 1 dose each flu season. It takes about 2 weeks for protection to develop after vaccination. There are many flu viruses, and they are always changing. Each year a new flu vaccine is made to protect against the influenza viruses believed to be likely to cause disease in the upcoming flu season. Even when the vaccine doesnt exactly match these viruses, it may still provide some protection. Influenza vaccine does not cause flu. Influenza vaccine may be given at the same time as other vaccines. 3. Talk with your health care provider    Tell your vaccination provider if the person getting the vaccine:   Has had an allergic reaction after a previous dose of influenza vaccine, or has any severe, life-threatening allergies    Has ever had Guillain-Barré Syndrome (also called GBS)    In some cases, your health care provider may decide to postpone influenza vaccination until a future visit. Influenza vaccine can be administered at any time during pregnancy. People who are or will be pregnant during influenza season should receive inactivated influenza vaccine. People with minor illnesses, such as a cold, may be vaccinated. People who are moderately or severely ill should usually wait until they recover before getting influenza vaccine. Your health care provider can give you more information.     4. Risks of a vaccine reaction     Soreness, redness, and swelling where the shot is given, fever, muscle aches, and headache can happen after influenza vaccination.  There may be a very small increased risk of Guillain-Barré Syndrome (GBS) after inactivated influenza vaccine (the flu shot). Jared Dumont children who get the flu shot along with pneumococcal vaccine (PCV13) and/or DTaP vaccine at the same time might be slightly more likely to have a seizure caused by fever. Tell your health care provider if a child who is getting flu vaccine has ever had a seizure. People sometimes faint after medical procedures, including vaccination. Tell your provider if you feel dizzy or have vision changes or ringing in the ears. As with any medicine, there is a very remote chance of a vaccine causing a severe allergic reaction, other serious injury, or death. 5. What if there is a serious problem? An allergic reaction could occur after the vaccinated person leaves the clinic. If you see signs of a severe allergic reaction (hives, swelling of the face and throat, difficulty breathing, a fast heartbeat, dizziness, or weakness), call 9-1-1 and get the person to the nearest hospital.    For other signs that concern you, call your health care provider. Adverse reactions should be reported to the Vaccine Adverse Event Reporting System (VAERS). Your health care provider will usually file this report, or you can do it yourself. Visit the VAERS website at www.vaers. hhs.gov or call 2-200.418.5758. VAERS is only for reporting reactions, and VAERS staff members do not give medical advice. 6. The National Vaccine Injury Compensation Program    The [facility] Vaccine Injury Compensation Program (VICP) is a federal program that was created to compensate people who may have been injured by certain vaccines. Claims regarding alleged injury or death due to vaccination have a time limit for filing, which may be as short as two years.  Visit the VICP website at www.hrsa.gov/vaccinecompensation or call 2-677.221.8482 to learn about the program and about filing a claim. 7. How can I learn more?  Ask your health care provider.  Call your local or state health department.  Visit the website of the Food and Drug Administration (FDA) for vaccine package inserts and additional information at www.fda.gov/vaccines-blood-biologics/vaccines.  Contact the Centers for Disease Control and Prevention (CDC):  - Call 7-967.555.3360 (1-800-CDC-INFO) or  - Visit CDCs influenza website at www.cdc.gov/flu. Vaccine Information Statement   Inactivated Influenza Vaccine   8/6/2021  42 ANGEL Coulter [id number]   Department of Health and Human Services  Centers for Disease Control and Prevention    Office Use Only

## 2022-01-09 DIAGNOSIS — G47.11 IDIOPATHIC HYPERSOMNIA: ICD-10-CM

## 2022-01-10 RX ORDER — METHYLPHENIDATE HYDROCHLORIDE 20 MG/1
20 TABLET ORAL 2 TIMES DAILY
Qty: 60 TABLET | Refills: 0 | Status: SHIPPED | OUTPATIENT
Start: 2022-01-10 | End: 2022-02-11 | Stop reason: SDUPTHER

## 2022-01-10 NOTE — TELEPHONE ENCOUNTER
Last Refill: 11-29-21  Last Visit: 12/28/2021   Next Visit: Visit date not found     Requested Prescriptions     Pending Prescriptions Disp Refills    methylphenidate HCl (RITALIN) 20 mg tablet 60 Tablet 0     Sig: Take 1 Tablet by mouth two (2) times a day. Max Daily Amount: 40 mg.

## 2022-02-11 DIAGNOSIS — G47.11 IDIOPATHIC HYPERSOMNIA: ICD-10-CM

## 2022-02-11 NOTE — TELEPHONE ENCOUNTER
Last Refill: 1-10-22  Last Visit: 12/28/2021   Next Visit: 7/15/2022     Requested Prescriptions     Pending Prescriptions Disp Refills    methylphenidate HCl (RITALIN) 20 mg tablet 60 Tablet 0     Sig: Take 1 Tablet by mouth two (2) times a day. Max Daily Amount: 40 mg.

## 2022-02-13 RX ORDER — METHYLPHENIDATE HYDROCHLORIDE 20 MG/1
20 TABLET ORAL 2 TIMES DAILY
Qty: 60 TABLET | Refills: 0 | Status: SHIPPED | OUTPATIENT
Start: 2022-02-13 | End: 2022-03-28 | Stop reason: SDUPTHER

## 2022-03-18 PROBLEM — J30.9 ALLERGIC RHINITIS: Status: ACTIVE | Noted: 2017-12-20

## 2022-03-18 PROBLEM — K50.90 CROHN'S DISEASE (HCC): Status: ACTIVE | Noted: 2017-12-20

## 2022-03-18 PROBLEM — G47.11 IDIOPATHIC HYPERSOMNIA: Status: ACTIVE | Noted: 2017-12-20

## 2022-03-19 PROBLEM — F32.A DEPRESSION: Status: ACTIVE | Noted: 2017-12-20

## 2022-03-19 PROBLEM — I10 ESSENTIAL HYPERTENSION: Status: ACTIVE | Noted: 2017-12-20

## 2022-03-20 PROBLEM — E66.01 OBESITY, MORBID (HCC): Status: ACTIVE | Noted: 2018-01-02

## 2022-03-23 RX ORDER — CITALOPRAM 20 MG/1
20 TABLET, FILM COATED ORAL DAILY
Qty: 90 TABLET | Refills: 0 | Status: SHIPPED | OUTPATIENT
Start: 2022-03-23 | End: 2022-07-15 | Stop reason: SDUPTHER

## 2022-03-23 NOTE — TELEPHONE ENCOUNTER
Last Refill: 1-4-21  Last Visit: 12/28/2021   Next Visit: 7/15/2022     Requested Prescriptions     Pending Prescriptions Disp Refills    citalopram (CeleXA) 20 mg tablet 90 Tablet 0     Sig: Take 1 Tablet by mouth daily.

## 2022-03-28 DIAGNOSIS — G47.11 IDIOPATHIC HYPERSOMNIA: ICD-10-CM

## 2022-03-28 RX ORDER — METHYLPHENIDATE HYDROCHLORIDE 20 MG/1
20 TABLET ORAL 2 TIMES DAILY
Qty: 60 TABLET | Refills: 0 | Status: SHIPPED | OUTPATIENT
Start: 2022-03-28 | End: 2022-05-09 | Stop reason: SDUPTHER

## 2022-03-28 NOTE — TELEPHONE ENCOUNTER
Last Refill: 2-13-22  Last Visit: 12/28/2021   Next Visit: 7/15/2022     Requested Prescriptions     Pending Prescriptions Disp Refills    methylphenidate HCl (RITALIN) 20 mg tablet 60 Tablet 0     Sig: Take 1 Tablet by mouth two (2) times a day. Max Daily Amount: 40 mg.

## 2022-04-04 NOTE — PATIENT INSTRUCTIONS
DASH Diet: Care Instructions  Your Care Instructions     The DASH diet is an eating plan that can help lower your blood pressure. DASH stands for Dietary Approaches to Stop Hypertension. Hypertension is high blood pressure. The DASH diet focuses on eating foods that are high in calcium, potassium, and magnesium. These nutrients can lower blood pressure. The foods that are highest in these nutrients are fruits, vegetables, low-fat dairy products, nuts, seeds, and legumes. But taking calcium, potassium, and magnesium supplements instead of eating foods that are high in those nutrients does not have the same effect. The DASH diet also includes whole grains, fish, and poultry. The DASH diet is one of several lifestyle changes your doctor may recommend to lower your high blood pressure. Your doctor may also want you to decrease the amount of sodium in your diet. Lowering sodium while following the DASH diet can lower blood pressure even further than just the DASH diet alone. Follow-up care is a key part of your treatment and safety. Be sure to make and go to all appointments, and call your doctor if you are having problems. It's also a good idea to know your test results and keep a list of the medicines you take. How can you care for yourself at home? Following the DASH diet  · Eat 4 to 5 servings of fruit each day. A serving is 1 medium-sized piece of fruit, ½ cup chopped or canned fruit, 1/4 cup dried fruit, or 4 ounces (½ cup) of fruit juice. Choose fruit more often than fruit juice. · Eat 4 to 5 servings of vegetables each day. A serving is 1 cup of lettuce or raw leafy vegetables, ½ cup of chopped or cooked vegetables, or 4 ounces (½ cup) of vegetable juice. Choose vegetables more often than vegetable juice. · Get 2 to 3 servings of low-fat and fat-free dairy each day. A serving is 8 ounces of milk, 1 cup of yogurt, or 1 ½ ounces of cheese. · Eat 6 to 8 servings of grains each day.  A serving is 1 slice of bread, 1 ounce of dry cereal, or ½ cup of cooked rice, pasta, or cooked cereal. Try to choose whole-grain products as much as possible. · Limit lean meat, poultry, and fish to 2 servings each day. A serving is 3 ounces, about the size of a deck of cards. · Eat 4 to 5 servings of nuts, seeds, and legumes (cooked dried beans, lentils, and split peas) each week. A serving is 1/3 cup of nuts, 2 tablespoons of seeds, or ½ cup of cooked beans or peas. · Limit fats and oils to 2 to 3 servings each day. A serving is 1 teaspoon of vegetable oil or 2 tablespoons of salad dressing. · Limit sweets and added sugars to 5 servings or less a week. A serving is 1 tablespoon jelly or jam, ½ cup sorbet, or 1 cup of lemonade. · Eat less than 2,300 milligrams (mg) of sodium a day. If you limit your sodium to 1,500 mg a day, you can lower your blood pressure even more. · Be aware that all of these are the suggested number of servings for people who eat 1,800 to 2,000 calories a day. Your recommended number of servings may be different if you need more or fewer calories. Tips for success  · Start small. Do not try to make dramatic changes to your diet all at once. You might feel that you are missing out on your favorite foods and then be more likely to not follow the plan. Make small changes, and stick with them. Once those changes become habit, add a few more changes. · Try some of the following:  ? Make it a goal to eat a fruit or vegetable at every meal and at snacks. This will make it easy to get the recommended amount of fruits and vegetables each day. ? Try yogurt topped with fruit and nuts for a snack or healthy dessert. ? Add lettuce, tomato, cucumber, and onion to sandwiches. ? Combine a ready-made pizza crust with low-fat mozzarella cheese and lots of vegetable toppings. Try using tomatoes, squash, spinach, broccoli, carrots, cauliflower, and onions. ?  Have a variety of cut-up vegetables with a low-fat dip as an appetizer instead of chips and dip. ? Sprinkle sunflower seeds or chopped almonds over salads. Or try adding chopped walnuts or almonds to cooked vegetables. ? Try some vegetarian meals using beans and peas. Add garbanzo or kidney beans to salads. Make burritos and tacos with mashed marte beans or black beans. Where can you learn more? Go to http://www.miranda.com/  Enter H967 in the search box to learn more about \"DASH Diet: Care Instructions. \"  Current as of: August 31, 2020               Content Version: 12.8  © 0220-3366 [employer]. Care instructions adapted under license by [employer] (which disclaims liability or warranty for this information). If you have questions about a medical condition or this instruction, always ask your healthcare professional. [address] any warranty or liability for your use of this information. not motivated to quit

## 2022-05-09 DIAGNOSIS — G47.11 IDIOPATHIC HYPERSOMNIA: ICD-10-CM

## 2022-05-09 NOTE — TELEPHONE ENCOUNTER
PCP: Kayode Pak MD    Last appt: 12/28/2021  Future Appointments   Date Time Provider Jose Mina   7/15/2022  8:30 AM Kayode Pak MD [facility] AMB       Requested Prescriptions     Pending Prescriptions Disp Refills    methylphenidate HCl (RITALIN) 20 mg tablet 60 Tablet 0     Sig: Take 1 Tablet by mouth two (2) times a day.  Max Daily Amount: 40 mg.       Prior labs and Blood pressures:  BP Readings from Last 3 Encounters:   12/28/21 134/87   09/02/21 138/80   06/24/21 124/82     Lab Results   Component Value Date/Time    Sodium 142 06/24/2021 12:00 AM    Potassium 4.3 06/24/2021 12:00 AM    Chloride 102 06/24/2021 12:00 AM    CO2 28 06/24/2021 12:00 AM    Glucose 120 (H) 06/24/2021 12:00 AM    BUN 18 06/24/2021 12:00 AM    Creatinine 0.90 06/24/2021 12:00 AM    BUN/Creatinine ratio 20 06/24/2021 12:00 AM    GFR est AA 82 06/24/2021 12:00 AM    GFR est non-AA 71 06/24/2021 12:00 AM    Calcium 10.5 (H) 06/24/2021 12:00 AM

## 2022-05-10 RX ORDER — METHYLPHENIDATE HYDROCHLORIDE 20 MG/1
20 TABLET ORAL 2 TIMES DAILY
Qty: 60 TABLET | Refills: 0 | Status: SHIPPED | OUTPATIENT
Start: 2022-05-10 | End: 2022-08-22 | Stop reason: SDUPTHER

## 2022-07-13 NOTE — PROGRESS NOTES
Subjective:     Chief Complaint   Patient presents with   [address] Howard is a 61 y.o. F.  She has a past medical history of essential hypertension and crohn's disease. I reviewed the medical record. The patient was seen most recently by her primary care provider in December 2021. She was noted to have a history of hypertension which was being managed with hydrochlorothiazide. She was felt to have good control of her depression with the use of Celexa. She was being treated for idiopathic hypersomnia with Ritalin, which was felt to be effective for her. Physical examination was notable for severe morbid obesity with a BMI of 39.96 kg/m². No changes were made to her medication regimen, and she was advised to follow-up regularly. Today, the patient comes in for routine preventive care and follow-up on her chronic medical concerns. Since her last visit there have been no significant clinical changes. Overall, she reports feeling generally well. She continues on Pentasa for her history of Crohn's disease, and with this has had no significant GI bleeding, abdominal pain, fevers, chills, or changes in appetite. Overall, she feels that this is under good control. She has pending follow-up with gastroenterology later this year. Her next colonoscopy is noted to be due in 2023 for a 3-year follow-up. She also continues on Celexa for her history of anxiety with depression. She denies any recent suicidal or homicidal ideation, and overall feels that her mood has been well controlled with the use of this medication. No GI symptoms or other sexual side effects associated with the medication currently. She does not measure her blood pressure at home routinely, but thinks that they overall have been stable at her past few visits. She denies any chest pain, shortness of breath, or lightheadedness or dizziness with the use of hydrochlorothiazide.   I reviewed the patient's laboratory studies with her noting that her most recent calcium obtained several months ago was elevated which could be secondary to the hydrochlorothiazide. She denies any history of kidney stones or any muscle cramping or other problems. She has a history of idiopathic hypersomnia, for which she had previously been seen by a sleep medicine physician in 2018. She says today that there was no screening for obstructive sleep apnea at the time. She requests a refill of her Ritalin, but also says that she would like to be screened for sleep apnea. She says that her phone has been monitoring her oxygen levels at night, and she shows a graph which indeed shows several episodes measured by her watch and phone of hypoxia down to the 70% range. She relates to snoring, daytime fatigue, and has woken on a couple of occasions gasping for air. She admits to difficulty with weight loss, despite adherence to therapeutic lifestyle changes, but thinks that she will be able to reduce stress eating when she retires. She has no prior history of use of any pharmacotherapy for her obesity, but is interested in this. No history of medullary thyroid cancer or pheochromocytoma, and no history of multiple endocrine neoplasia type II. She is potentially interested in a GLP-1 agonist but wishes to explore the issue further. She declines referral to nutrition. Other than this, she reports feeling generally well. Her last mammogram was performed last year and a repeat will be due again next year. She received her last COVID-19 booster in August.  We discussed repeating the booster. Routine Healthcare Maintenance issues are reviewed and discussed with the patient as noted below. Orders to update gaps in healthcare maintenance were placed as noted below in the Assessment and Plan, where applicable. Review of systems is otherwise unremarkable. 10-year Cardiovascular Risk Demar Nichols, 2013):   The 10-year ASCVD risk score (Florence Omer., et al., 2013) is: 3.3%    Values used to calculate the score:      Age: 61 years      Sex: Female      Is Non- : No      Diabetic: No      Tobacco smoker: No      Systolic Blood Pressure: 260 mmHg      Is BP treated: Yes      HDL Cholesterol: 54 mg/dL      Total Cholesterol: 171 mg/dL       Past Medical History:  Past Medical History:   Diagnosis Date    Allergic rhinitis 12/20/2017    At risk for obstructive sleep apnea     Crohn's disease ([facility] 12/20/2017    Depression 12/20/2017    Essential hypertension 12/20/2017    Idiopathic hypersomnia 12/20/2017    Morbid obesity ([facility]     Shift work sleep disorder 12/20/2017       Past Surgical Histor:  Past Surgical History:   Procedure Laterality Date    HX ORTHOPAEDIC         Allergies:  No Known Allergies    Medications:  Current Outpatient Medications   Medication Sig Dispense Refill    methylphenidate HCl (RITALIN) 20 mg tablet Take 1 Tablet by mouth two (2) times a day for 30 days. Max Daily Amount: 40 mg. 60 Tablet 0    hydroCHLOROthiazide (HYDRODIURIL) 25 mg tablet Take 1 Tablet by mouth daily. 90 Tablet 3    citalopram (CeleXA) 20 mg tablet Take 1 Tablet by mouth daily. 90 Tablet 3    mesalamine (PENTASA) 500 mg CR capsule Take 500 mg by mouth three (3) times daily.          Social History:  Social History     Socioeconomic History    Marital status:    Tobacco Use    Smoking status: Never Smoker    Smokeless tobacco: Never Used   Vaping Use    Vaping Use: Never used     Social Determinants of Health     Physical Activity: Unknown    Days of Exercise per Week: 0 days       Family History:  Family History   Problem Relation Age of Onset    Hypertension Mother     Diabetes Mother     Diabetes Father     Cancer Father        Immunizations:  Immunization History   Administered Date(s) Administered    COVID-19, PFIZER PURPLE top, DILUTE for use, (age 15 y+), IM, 30mcg/0.3mL 01/28/2021, 03/03/2021    Influenza Vaccine 11/02/2017, 10/15/2018, 10/20/2019, 10/27/2020    Influenza Vaccine (Quad) Mdck Pf (>2 Yrs Flucelvax QUAD [id number]) 10/15/2018, 10/20/2019, 10/23/2020, 10/27/2020    Influenza, Quadrivalent, Adjuvanted (>65 Yrs FLUAD QUAD [id number]) 12/28/2021    Tdap 07/28/2019    Zoster Recombinant 03/15/2019, 06/03/2019        Healthcare Maintenance:  Health Maintenance   Topic Date Due    COVID-19 Vaccine (3 - Booster for [employer] series) 08/03/2021    Depression Monitoring  06/24/2022    Flu Vaccine (1) 09/01/2022    Breast Cancer Screen Mammogram  07/16/2023    Colorectal Cancer Screening Combo  11/12/2023    Cervical cancer screen  07/16/2024    Lipid Screen  06/24/2026    DTaP/Tdap/Td series (2 - Td or Tdap) 07/28/2029    Hepatitis C Screening  Completed    Shingrix Vaccine Age 50>  Completed    Pneumococcal 0-64 years  Aged Out        Review of Systems:  ROS:  Review of Systems   Constitutional: Negative. HENT: Negative. Eyes: Negative. Respiratory: Negative. Cardiovascular: Negative. Gastrointestinal: Negative. Genitourinary: Negative. Musculoskeletal: Negative. Skin: Negative. Neurological: Negative. Endo/Heme/Allergies: Negative. Psychiatric/Behavioral: Negative. ROS otherwise negative      Objective:     Vital Signs:  Visit Vitals  /88 (BP 1 Location: Right arm, BP Patient Position: Sitting, BP Cuff Size: Large adult)   Pulse 99   Temp 98.3 °F (36.8 °C) (Oral)   Resp 18   Ht 5' 6\" (1.676 m)   Wt 242 lb 6.4 oz (110 kg)   SpO2 97%   BMI 39.12 kg/m²       BMI:  Body mass index is 39.12 kg/m². Physical Examination:  Physical Exam  Constitutional:       Appearance: Normal appearance. She is obese. HENT:      Head: Normocephalic and atraumatic. Right Ear: External ear normal.      Left Ear: External ear normal.      Nose: Nose normal.      Mouth/Throat:      Mouth: Mucous membranes are moist.      Pharynx: Oropharynx is clear.  No oropharyngeal exudate or posterior oropharyngeal erythema. Cardiovascular:      Rate and Rhythm: Normal rate and regular rhythm. Pulses: Normal pulses. Heart sounds: Normal heart sounds. No murmur heard. No friction rub. No gallop. Pulmonary:      Effort: Pulmonary effort is normal. No respiratory distress. Breath sounds: Normal breath sounds. No wheezing, rhonchi or rales. Abdominal:      General: Abdomen is flat. Bowel sounds are normal. There is no distension. Palpations: Abdomen is soft. Tenderness: There is no abdominal tenderness. There is no guarding. Musculoskeletal:         General: No swelling, tenderness or deformity. Normal range of motion. Cervical back: Normal range of motion and neck supple. No rigidity or tenderness. Skin:     General: Skin is warm and dry. Findings: No erythema, lesion or rash. Neurological:      General: No focal deficit present. Mental Status: She is alert and oriented to person, place, and time. Mental status is at baseline. Sensory: No sensory deficit. Motor: No weakness. Gait: Gait normal.      Deep Tendon Reflexes: Reflexes normal.   Psychiatric:         Mood and Affect: Mood normal.         Behavior: Behavior normal.         Judgment: Judgment normal.          Physical exam otherwise negative    Diagnostic Testing:    Laboratory Studies:  No visits with results within 1 Year(s) from this visit.    Latest known visit with results is:   Office Visit on 06/24/2021   Component Date Value Ref Range Status    Specific Gravity 06/24/2021 1.026  1.005 - 1.030 Final    pH (UA) 06/24/2021 5.5  5.0 - 7.5 Final    Color 06/24/2021 Yellow  Yellow Final    Appearance 06/24/2021 Clear  Clear Final    Leukocyte Esterase 06/24/2021 1+* Negative Final    Protein 06/24/2021 Negative  Negative/Trace Final    Glucose 06/24/2021 Negative  Negative Final    Ketone 06/24/2021 Negative  Negative Final    Blood 06/24/2021 Negative  Negative Final    Bilirubin 06/24/2021 Negative  Negative Final    Urobilinogen 06/24/2021 0.2  0.2 - 1.0 mg/dL Final    Nitrites 06/24/2021 Negative  Negative Final    Microscopic Examination 06/24/2021 See additional order   Final    Microscopic was indicated and was performed.  URINALYSIS REFLEX 06/24/2021 Comment   Final    This specimen has reflexed to a Urine Culture.  TSH 06/24/2021 3.190  0.450 - 4.500 uIU/mL Final    Glucose 06/24/2021 120* 65 - 99 mg/dL Final    BUN 06/24/2021 18  6 - 24 mg/dL Final    Creatinine 06/24/2021 0.90  0.57 - 1.00 mg/dL Final    GFR est non-AA 06/24/2021 71  >59 mL/min/1.73 Final    GFR est AA 06/24/2021 82  >59 mL/min/1.73 Final    Comment: **[facility] currently reports eGFR in compliance with the current**    recommendations of the [employer]. [facility] will    update reporting as new guidelines are published from the NKF-ASN    Task force.  BUN/Creatinine ratio 06/24/2021 20  9 - 23 Final    Sodium 06/24/2021 142  134 - 144 mmol/L Final    Potassium 06/24/2021 4.3  3.5 - 5.2 mmol/L Final    Chloride 06/24/2021 102  96 - 106 mmol/L Final    CO2 06/24/2021 28  20 - 29 mmol/L Final    Calcium 06/24/2021 10.5* 8.7 - 10.2 mg/dL Final    Protein, total 06/24/2021 7.1  6.0 - 8.5 g/dL Final    Albumin 06/24/2021 4.3  3.8 - 4.9 g/dL Final    GLOBULIN, TOTAL 06/24/2021 2.8  1.5 - 4.5 g/dL Final    A-G Ratio 06/24/2021 1.5  1.2 - 2.2 Final    Bilirubin, total 06/24/2021 0.4  0.0 - 1.2 mg/dL Final    Alk.  phosphatase 06/24/2021 124* 48 - 121 IU/L Final    AST (SGOT) 06/24/2021 19  0 - 40 IU/L Final    ALT (SGPT) 06/24/2021 16  0 - 32 IU/L Final    Cholesterol, total 06/24/2021 171  100 - 199 mg/dL Final    Triglyceride 06/24/2021 89  0 - 149 mg/dL Final    HDL Cholesterol 06/24/2021 54  >39 mg/dL Final    VLDL, calculated 06/24/2021 16  5 - 40 mg/dL Final    LDL, calculated 06/24/2021 101* 0 - 99 mg/dL Final    WBC 06/24/2021 8.1  3.4 - 10.8 x10E3/uL Final  RBC 06/24/2021 4.66  3.77 - 5.28 x10E6/uL Final    HGB 06/24/2021 15.1  11.1 - 15.9 g/dL Final    HCT 06/24/2021 45.6  34.0 - 46.6 % Final    MCV 06/24/2021 98* 79 - 97 fL Final    MCH 06/24/2021 32.4  26.6 - 33.0 pg Final    MCHC 06/24/2021 33.1  31.5 - 35.7 g/dL Final    RDW 06/24/2021 12.6  11.7 - 15.4 % Final    PLATELET 62/71/3675 911  150 - 450 x10E3/uL Final    NEUTROPHILS 06/24/2021 63  Not Estab. % Final    Lymphocytes 06/24/2021 27  Not Estab. % Final    MONOCYTES 06/24/2021 5  Not Estab. % Final    EOSINOPHILS 06/24/2021 5  Not Estab. % Final    BASOPHILS 06/24/2021 0  Not Estab. % Final    ABS. NEUTROPHILS 06/24/2021 5.0  1.4 - 7.0 x10E3/uL Final    Abs Lymphocytes 06/24/2021 2.2  0.7 - 3.1 x10E3/uL Final    ABS. MONOCYTES 06/24/2021 0.4  0.1 - 0.9 x10E3/uL Final    ABS. EOSINOPHILS 06/24/2021 0.4  0.0 - 0.4 x10E3/uL Final    ABS. BASOPHILS 06/24/2021 0.0  0.0 - 0.2 x10E3/uL Final    IMMATURE GRANULOCYTES 06/24/2021 0  Not Estab. % Final    ABS. IMM. GRANS. 06/24/2021 0.0  0.0 - 0.1 x10E3/uL Final    WBC 06/24/2021 0-5  0 - 5 /hpf Final    RBC 06/24/2021 0-2  0 - 2 /hpf Final    Epithelial cells 06/24/2021 0-10  0 - 10 /hpf Final    Casts 06/24/2021 None seen  None seen /lpf Final    Bacteria 06/24/2021 Few  None seen/Few Final    Urine Culture, Routine 06/24/2021    Final                    Value:Mixed urogenital jyoti  10,000-25,000 colony forming units per mL           Radiographic Studies:  XR Results (most recent):  Results from Hospital Encounter encounter on 03/02/17    XR ARTHRO SHOULDER LT    Narrative  Left shoulder arthrogram    Indication: Left shoulder pain    Comparison: None. Findings: Preliminary images of the left shoulder show no evidence of fracture  or dislocation. The acromioclavicular and glenohumeral joints are intact.     After written and verbal consent was obtained from the patient explaining the  risk and benefits of the procedure, the patient was placed supine on the  fluoroscopy table. The left shoulder was prepped and draped in the normal  sterile fashion. Skin and subcutaneous tissues were anesthetized with lidocaine  with bicarbonate. Under fluoroscopic guidance, a 22-gauge spinal needle was  advanced into the left glenohumeral joint via the rotator interval. Needle tip  position was confirmed with a small amount of iodinated contrast. Subsequently  12 cc of a mixture of 10 cc normal saline, 5 cc iodinated contrast, 5 cc 1%  lidocaine and 0.05 mL of Gadavist were instilled. The needle was removed and a  bandage was placed. Patient tolerated the procedure well without immediate  complication. After mildly exercising the shoulder the post arthrogram images demonstrate  contrast appropriately within the joint. No evidence of a full-thickness rotator  cuff tear. .    Fluoroscopy time: 0.2 minutes    Fluoroscopy dose: 13.05 mGy    Impression  Impression:  1. Successful left shoulder arthrogram. MR to follow  2. No immediate complications  3. No evidence of a full-thickness rotator cuff tear. SHEREE Results (most recent):  No results found for this or any previous visit. CT Results (most recent):  No results found for this or any previous visit. DEXA Results (most recent):  No results found for this or any previous visit. MRI Results (most recent):  Results from [place] encounter on 03/02/17    MRI SHOULDER LT W CONT    Narrative  EXAM:  MRI SHOULDER LT W CONT    INDICATION: Left shoulder pain    COMPARISON: None. TECHNIQUE: MR arthrogram of the left shoulder performed after the plain  arthrographic portion of the examination utilizing axial T1 and proton density  fat-saturated; oblique coronal T1 fat-saturated and T2 fat-saturated; oblique  sagittal T1 fat-saturated; and Aber T1 fat saturated sequences . CONTRAST: Intra-articular gadolinium.     FINDINGS: A.C. joint: Intact Anterior acromion process type: 2    Bone marrow: A small focus of cystic change in the posterior greater tuberosity  related to entering vasculature. No acute fracture, dislocation, or marrow  replacing process. Joint fluid: Contrast is present in the glenohumeral joint. Contrast has  incidentally decompressed from the rotator interval since the arthrogram  procedure into the subcutaneous coracoid bursa and into the subcutaneous  acromial and subdeltoid bursa. The joint as this was not well distended by  contrast. The amount of contrast within the joint space however is still  diagnostic. Rotator cuff tendons: Intact. Biceps tendon: Intact and located within the bicipital groove. Muscles: No edema or atrophy. Glenoid labrum: Intact. Glenohumeral ligaments: within normal limits. Glenohumeral articular cartilage: Intact. No focal osteochondral lesion. Soft tissue mass: None. Impression  IMPRESSION:    1. The contrast has incidentally decompressed from the joint space through the  rotator interval into the subcoracoid, subacromial, and subdeltoid bursa since  the arthrogram procedure. Contrast within the joint space however remains  diagnostic. 2. No evidence of internal derangement of the joint. Assessment/Plan:       ICD-10-CM ICD-9-CM    1. Routine adult health maintenance  Z00.00 V70.0 HEMOGLOBIN A1C WITH EAG      CBC WITH AUTOMATED DIFF      METABOLIC PANEL, COMPREHENSIVE      LIPID PANEL      MICROALBUMIN, UR, RAND W/ MICROALB/CREAT RATIO   2. Crohn's disease without complication, unspecified gastrointestinal tract location ([facility]  K50.90 555.9    3. Obesity, morbid ([facility]  E66.01 278.01    4. Essential hypertension  I10 401.9 hydroCHLOROthiazide (HYDRODIURIL) 25 mg tablet   5. Idiopathic hypersomnia  G47.11 780.54 SLEEP MEDICINE REFERRAL      methylphenidate HCl (RITALIN) 20 mg tablet      TOXASSURE SELECT 13 (MW)   6. At risk for obstructive sleep apnea  Z91.89 V49.89 SLEEP MEDICINE REFERRAL   7.  Other depression  F32.89 311 citalopram (CeleXA) 20 mg tablet          Healthcare Maintenance:  - Preventive measures are reviewed as per above  - Up to date on routine interventions except as noted above  - Orders placed to update gaps as noted  - Notes: Up-to-date with regard to routine screening examinations and vaccines. Discussed the COVID-19 booster again today. Colonoscopy and mammography will be due again next year. Essential Hypertension/Blood Pressure Management:   - Home BP Readings: not doing   - Current Control: optimal   - Target BP: Less than 140/90 mmHg   - Relevant BP Meds:  Key CAD CHF Meds             hydroCHLOROthiazide (HYDRODIURIL) 25 mg tablet (Taking) Take 1 Tablet by mouth daily.             - Plan: continue current treatment regimen, continue current meds, dietary sodium restriction, regular aerobic exercise, weight loss   - Notes: Checking labs today. Note prior elevated serum calcium, could be associated with HCTZ. Crohn's Disease:   - Follows with gastroenterology. Asymptomatic. Continuing Pentasa, no ongoing systemic or abdominal complaints. Idiopathic Hypersomnia:   - As per history. I think that more of her symptoms are more likely from obstructive sleep apnea, see below. Continuing with Ritalin. Referral to sleep medicine placed. (at risk for) Obstructive Sleep Apnea:   - Snoring: YES   - Tired/Fatigued: YES   - Observed Apneic Episodes: YES   - Treatment for Hypertension: YES   - BMI > 35: YES   - Age > 50: YES   - Neck circumference > 40 cm: YES   - Gender = Male: NO   - Sleep Study: requested   - CPAP: ---   - Notes: Strong suspicion for obstructive sleep apnea in light of body habitus and longtime history of fatigue. Referral to sleep medicine is provided. Obesity:   - Body mass index is 39.12 kg/m².    - Current Obesity Medications (if any):   Key Obesity Meds             hydroCHLOROthiazide (HYDRODIURIL) 25 mg tablet (Taking) Take 1 Tablet by mouth daily.         - Discussed therapeutic lifestyle changes: dietary modifications, caloric restriction, increased aerobic exercise, resistance training   - Patient adherent to dietary modifications x 6 months (e.g., Mediterranean Diet): YES   - Previous Nutrition Referral or formal weight loss plan x 6 m: NO   - Comorbidities:   - Obstructive sleep apnea: suspected   - Obesity-related hypoventilation:NO    - Diabetes Mellitus:NO   - Hypertension:YES   - Other (GERD, OA, back pain):NO   - Qualifies for medication or bariatric surgery referral? YES   - Notes: Strongly consider referral to bariatric surgery or initiation of GLP-1 agonist.  Patient precontemplative at this point. Continuing with therapeutic lifestyle changes as she is currently doing. Follow-up and Dispositions    · Return in about 6 months (around 1/15/2023). Follow-up and Disposition History         Suleiman Wesley MD    Please note that this dictation was completed with [employer] the computer voice recognition software. Quite often unanticipated grammatical, syntax, homophones, and other interpretive errors are inadvertently transcribed by the computer software. Please disregard these errors. Please excuse any errors that have escaped final proofreading.      This was a complex patient and total appointment time was at least 40 minutes, to include:  - review of medical record  - history gathering, physical examination, and review of systems  - medication reconciliation  - medical decision-making  - counseling on the plan of care

## 2022-07-15 ENCOUNTER — OFFICE VISIT (OUTPATIENT)
Dept: INTERNAL MEDICINE CLINIC | Age: 57
End: 2022-07-15
Payer: COMMERCIAL

## 2022-07-15 ENCOUNTER — TELEPHONE (OUTPATIENT)
Dept: SLEEP MEDICINE | Age: 57
End: 2022-07-15

## 2022-07-15 VITALS
HEART RATE: 99 BPM | SYSTOLIC BLOOD PRESSURE: 130 MMHG | HEIGHT: 66 IN | OXYGEN SATURATION: 97 % | WEIGHT: 242.4 LBS | TEMPERATURE: 98.3 F | RESPIRATION RATE: 18 BRPM | DIASTOLIC BLOOD PRESSURE: 88 MMHG | BODY MASS INDEX: 38.96 KG/M2

## 2022-07-15 DIAGNOSIS — F32.89 OTHER DEPRESSION: ICD-10-CM

## 2022-07-15 DIAGNOSIS — K50.90 CROHN'S DISEASE WITHOUT COMPLICATION, UNSPECIFIED GASTROINTESTINAL TRACT LOCATION (HCC): ICD-10-CM

## 2022-07-15 DIAGNOSIS — Z00.00 ROUTINE ADULT HEALTH MAINTENANCE: Primary | ICD-10-CM

## 2022-07-15 DIAGNOSIS — Z91.89 AT RISK FOR OBSTRUCTIVE SLEEP APNEA: ICD-10-CM

## 2022-07-15 DIAGNOSIS — I10 ESSENTIAL HYPERTENSION: ICD-10-CM

## 2022-07-15 DIAGNOSIS — G47.11 IDIOPATHIC HYPERSOMNIA: ICD-10-CM

## 2022-07-15 DIAGNOSIS — E66.01 OBESITY, MORBID (HCC): ICD-10-CM

## 2022-07-15 PROBLEM — F33.0 MAJOR DEPRESSIVE DISORDER, RECURRENT, MILD (HCC): Status: ACTIVE | Noted: 2022-07-15

## 2022-07-15 PROCEDURE — 99215 OFFICE O/P EST HI 40 MIN: CPT | Performed by: INTERNAL MEDICINE

## 2022-07-15 PROCEDURE — 99396 PREV VISIT EST AGE 40-64: CPT | Performed by: INTERNAL MEDICINE

## 2022-07-15 RX ORDER — HYDROCHLOROTHIAZIDE 25 MG/1
25 TABLET ORAL DAILY
Qty: 90 TABLET | Refills: 3 | Status: SHIPPED | OUTPATIENT
Start: 2022-07-15

## 2022-07-15 RX ORDER — CITALOPRAM 20 MG/1
20 TABLET, FILM COATED ORAL DAILY
Qty: 90 TABLET | Refills: 3 | Status: SHIPPED | OUTPATIENT
Start: 2022-07-15

## 2022-07-15 RX ORDER — METHYLPHENIDATE HYDROCHLORIDE 20 MG/1
20 TABLET ORAL 2 TIMES DAILY
Qty: 60 TABLET | Refills: 0 | Status: SHIPPED | OUTPATIENT
Start: 2022-07-15 | End: 2022-08-14

## 2022-07-15 NOTE — PATIENT INSTRUCTIONS
Please continue monitoring your blood pressure at home using your home blood pressure monitor. Please record your readings and bring these with you to your next visit. DASH Diet: Care Instructions  Your Care Instructions     The DASH diet is an eating plan that can help lower your blood pressure. DASH stands for Dietary Approaches to Stop Hypertension. Hypertension is high blood pressure. The DASH diet focuses on eating foods that are high in calcium, potassium, and magnesium. These nutrients can lower blood pressure. The foods that are highest in these nutrients are fruits, vegetables, low-fat dairy products, nuts, seeds, and legumes. But taking calcium, potassium, and magnesium supplements instead of eating foods that are high in those nutrients does not have the same effect. The DASH diet also includes whole grains, fish, and poultry. The DASH diet is one of several lifestyle changes your doctor may recommend to lower your high blood pressure. Your doctor may also want you to decrease the amount of sodium in your diet. Lowering sodium while following the DASH diet can lower blood pressure even further than just the DASH diet alone. Follow-up care is a key part of your treatment and safety. Be sure to make and go to all appointments, and call your doctor if you are having problems. It's also a good idea to know your test results and keep a list of the medicines you take. How can you care for yourself at home? Following the DASH diet  · Eat 4 to 5 servings of fruit each day. A serving is 1 medium-sized piece of fruit, ½ cup chopped or canned fruit, 1/4 cup dried fruit, or 4 ounces (½ cup) of fruit juice. Choose fruit more often than fruit juice. · Eat 4 to 5 servings of vegetables each day. A serving is 1 cup of lettuce or raw leafy vegetables, ½ cup of chopped or cooked vegetables, or 4 ounces (½ cup) of vegetable juice. Choose vegetables more often than vegetable juice.   · Get 2 to 3 servings of low-fat and fat-free dairy each day. A serving is 8 ounces of milk, 1 cup of yogurt, or 1 ½ ounces of cheese. · Eat 6 to 8 servings of grains each day. A serving is 1 slice of bread, 1 ounce of dry cereal, or ½ cup of cooked rice, pasta, or cooked cereal. Try to choose whole-grain products as much as possible. · Limit lean meat, poultry, and fish to 2 servings each day. A serving is 3 ounces, about the size of a deck of cards. · Eat 4 to 5 servings of nuts, seeds, and legumes (cooked dried beans, lentils, and split peas) each week. A serving is 1/3 cup of nuts, 2 tablespoons of seeds, or ½ cup of cooked beans or peas. · Limit fats and oils to 2 to 3 servings each day. A serving is 1 teaspoon of vegetable oil or 2 tablespoons of salad dressing. · Limit sweets and added sugars to 5 servings or less a week. A serving is 1 tablespoon jelly or jam, ½ cup sorbet, or 1 cup of lemonade. · Eat less than 2,300 milligrams (mg) of sodium a day. If you limit your sodium to 1,500 mg a day, you can lower your blood pressure even more. · Be aware that all of these are the suggested number of servings for people who eat 1,800 to 2,000 calories a day. Your recommended number of servings may be different if you need more or fewer calories. Tips for success  · Start small. Do not try to make dramatic changes to your diet all at once. You might feel that you are missing out on your favorite foods and then be more likely to not follow the plan. Make small changes, and stick with them. Once those changes become habit, add a few more changes. · Try some of the following:  ? Make it a goal to eat a fruit or vegetable at every meal and at snacks. This will make it easy to get the recommended amount of fruits and vegetables each day. ? Try yogurt topped with fruit and nuts for a snack or healthy dessert. ? Add lettuce, tomato, cucumber, and onion to sandwiches.   ? Combine a ready-made pizza crust with low-fat mozzarella cheese and lots of vegetable toppings. Try using tomatoes, squash, spinach, broccoli, carrots, cauliflower, and onions. ? Have a variety of cut-up vegetables with a low-fat dip as an appetizer instead of chips and dip. ? Sprinkle sunflower seeds or chopped almonds over salads. Or try adding chopped walnuts or almonds to cooked vegetables. ? Try some vegetarian meals using beans and peas. Add garbanzo or kidney beans to salads. Make burritos and tacos with mashed marte beans or black beans. Where can you learn more? Go to http://www.miranda.com/  Enter H967 in the search box to learn more about \"DASH Diet: Care Instructions. \"  Current as of: January 10, 2022               Content Version: 13.2  © 0731-3502 [employer]. Care instructions adapted under license by [employer] (which disclaims liability or warranty for this information). If you have questions about a medical condition or this instruction, always ask your healthcare professional. [place] [address] any warranty or liability for your use of this information. Learning About the [address] [facility] Diet  What is the Mediterranean diet? The Mediterranean diet is a style of eating rather than a diet plan. It features foods eaten in [place], [place], [place], and other countries along the [facility]. It emphasizes eating foods like fish, fruits, vegetables, beans, high-fiber breads and whole grains, nuts, and olive oil. This style of eating includes limited red meat, cheese, and sweets. Why choose the Mediterranean diet? A Mediterranean-style diet may improve heart health. It contains more fat than other heart-healthy diets. But the fats are mainly from nuts, unsaturated oils (such as fish oils and olive oil), and certain nut or seed oils (such as canola, soybean, or flaxseed oil). These fats may help protect the heart and blood vessels.   How can you get started on the Mediterranean diet? Here are some things you can do to switch to a more Mediterranean way of eating. What to eat  · Eat a variety of fruits and vegetables each day, such as grapes, blueberries, tomatoes, broccoli, peppers, figs, olives, spinach, eggplant, beans, lentils, and chickpeas. · Eat a variety of whole-grain foods each day, such as oats, brown rice, and whole wheat bread, pasta, and couscous. · Eat fish at least 2 times a week. Try tuna, salmon, mackerel, lake trout, herring, or sardines. · Eat moderate amounts of low-fat dairy products, such as milk, cheese, or yogurt. · Eat moderate amounts of poultry and eggs. · Choose healthy (unsaturated) fats, such as nuts, olive oil, and certain nut or seed oils like canola, soybean, and flaxseed. · Limit unhealthy (saturated) fats, such as butter, palm oil, and coconut oil. And limit fats found in animal products, such as meat and dairy products made with whole milk. Try to eat red meat only a few times a month in very small amounts. · Limit sweets and desserts to only a few times a week. This includes sugar-sweetened drinks like soda. The Mediterranean diet may also include red wine with your meal--1 glass each day for women and up to 2 glasses a day for men. Tips for eating at home  · Use herbs, spices, garlic, lemon zest, and citrus juice instead of salt to add flavor to foods. · Add avocado slices to your sandwich instead of guidry. · Have fish for lunch or dinner instead of red meat. Brush the fish with olive oil, and broil or grill it. · Sprinkle your salad with seeds or nuts instead of cheese. · Cook with olive or canola oil instead of butter or oils that are high in saturated fat. · Switch from 2% milk or whole milk to 1% or fat-free milk. · Dip raw vegetables in a vinaigrette dressing or hummus instead of dips made from mayonnaise or sour cream.  · Have a piece of fruit for dessert instead of a piece of cake.  Try baked apples, or have some dried fruit. Tips for eating out  · Try broiled, grilled, baked, or poached fish instead of having it fried or breaded. · Ask your  to have your meals prepared with olive oil instead of butter. · Order dishes made with marinara sauce or sauces made from olive oil. Avoid sauces made from cream or mayonnaise. · Choose whole-grain breads, whole wheat pasta and pizza crust, brown rice, beans, and lentils. · Cut back on butter or margarine on bread. Instead, you can dip your bread in a small amount of olive oil. · Ask for a side salad or grilled vegetables instead of french fries or chips. Where can you learn more? Go to http://www.miranda.com/  Enter O407 in the search box to learn more about \"Learning About the Mediterranean Diet. \"  Current as of: September 8, 2021               Content Version: 13.2  © 0968-0411 [employer]. Care instructions adapted under license by [employer] (which disclaims liability or warranty for this information). If you have questions about a medical condition or this instruction, always ask your healthcare professional. [place] [address] any warranty or liability for your use of this information. Semaglutide: Patient drug information      Copyright 7723-3444 [address] rights reserved. Contributor Disclosures  (For additional information see \"Semaglutide: Drug information\")    You must carefully read the \"Consumer Information Use and Disclaimer\" below in order to understand and correctly use this information. Brand Names: [place]  · Ozempic (0.25 or 0.5 MG/DOSE);  · Ozempic (1 MG/DOSE);  · Ozempic (2 MG/DOSE); · Rybelsus;  · Wegovy    Brand Names: [place] ([facility])  · Ozempic (0.25 or 0.5 MG/DOSE);  · Ozempic (1 MG/DOSE); · Rybelsus    Warning    This drug has been shown to cause thyroid cancer in some animals. It is not known if this happens in humans.  If thyroid cancer happens, it may be deadly if not found and treated early. Call your doctor right away if you have a neck mass, trouble breathing, trouble swallowing, or have hoarseness that will not go away. Do not use this drug if you have a health problem called Multiple Endocrine Neoplasia syndrome type 2 (MEN 2), or if you or a family member have had thyroid cancer. What is this drug used for? Ozempic prefilled pens and Rybelsus tablets: It is used to lower blood sugar in patients with high blood sugar (diabetes). Ozempic prefilled pens: It is used to lower the chance of heart attack, stroke, and death in some people. Wegovy prefilled pens: It is used to help with weight loss in certain people. What do I need to tell my doctor BEFORE I take this drug? For all uses of this drug:    If you are allergic to this drug; any part of this drug; or any other drugs, foods, or substances. Tell your doctor about the allergy and what signs you had. If you have ever had pancreatitis. If you have or have ever had depression or thoughts of suicide. If you are using another drug that has the same drug in it. If you are using another drug like this one. If you are not sure, ask your doctor or pharmacist.    If using for high blood sugar: If you have type 1 diabetes. Do not use this drug to treat type 1 diabetes. This is not a list of all drugs or health problems that interact with this drug. Tell your doctor and pharmacist about all of your drugs (prescription or OTC, natural products, vitamins) and health problems. You must check to make sure that it is safe for you to take this drug with all of your drugs and health problems. Do not start, stop, or change the dose of any drug without checking with your doctor. What are some things I need to know or do while I take this drug? For all uses of this drug:    Tell all of your health care providers that you take this drug.  This includes your doctors, nurses, pharmacists, and dentists. Follow the diet and workout plan that your doctor told you about. Have blood work checked as you have been told by the doctor. Talk with the doctor. Talk with your doctor before you drink alcohol. Kidney problems have happened. Sometimes, these may need to be treated in the hospital or with dialysis. If you cannot drink liquids by mouth or if you have upset stomach, throwing up, or diarrhea that does not go away; you need to avoid getting dehydrated. Contact your doctor to find out what to do. Dehydration may lead to new or worse kidney problems. Do not share pen or cartridge devices with another person even if the needle has been changed. Sharing these devices may pass infections from one person to another. This includes infections you may not know you have. If you are planning on getting pregnant, talk with your doctor. You may need to stop taking this drug at least 2 months before getting pregnant. If using for high blood sugar:    Wear disease medical alert ID (identification). Check your blood sugar as you have been told by your doctor. Do not drive if your blood sugar has been low. There is a greater chance of you having a crash. It may be harder to control blood sugar during times of stress such as fever, infection, injury, or surgery. A change in physical activity, exercise, or diet may also affect blood sugar. Tell your doctor if you are pregnant, plan on getting pregnant, or are breast-feeding. You will need to talk about the benefits and risks to you and the baby. If using for weight loss: If you have high blood sugar (diabetes), you will need to watch your blood sugar closely. Weight loss during pregnancy may cause harm to the unborn baby. If you get pregnant while taking this drug or if you want to get pregnant, call your doctor right away. Tell your doctor if you are breast-feeding. You will need to talk about any risks to your baby.     What are some side effects that I need to call my doctor about right away? WARNING/CAUTION: Even though it may be rare, some people may have very bad and sometimes deadly side effects when taking a drug. Tell your doctor or get medical help right away if you have any of the following signs or symptoms that may be related to a very bad side effect:    For all uses of this drug:    Signs of an allergic reaction, like rash; hives; itching; red, swollen, blistered, or peeling skin with or without fever; wheezing; tightness in the chest or throat; trouble breathing, swallowing, or talking; unusual hoarseness; or swelling of the mouth, face, lips, tongue, or throat. Signs of kidney problems like unable to pass urine, change in how much urine is passed, blood in the urine, or a big weight gain. Signs of gallbladder problems like pain in the upper right belly area, right shoulder area, or between the shoulder blades; change in stools; dark urine or yellow skin or eyes; or fever with chills. Very bad dizziness or passing out. A fast heartbeat. Change in eyesight. Low blood sugar can happen. The chance may be raised when this drug is used with other drugs for diabetes. Signs may be dizziness, headache, feeling sleepy or weak, shaking, fast heartbeat, confusion, hunger, or sweating. Call your doctor right away if you have any of these signs. Follow what you have been told to do for low blood sugar. This may include taking glucose tablets, liquid glucose, or some fruit juices. Severe and sometimes deadly pancreas problems (pancreatitis) have happened with this drug. Call your doctor right away if you have severe stomach pain, severe back pain, or severe upset stomach or throwing up. If using for weight loss:    New or worse behavior or mood changes like depression or thoughts of suicide. What are some other side effects of this drug? All drugs may cause side effects.  However, many people have no side effects or only have minor side effects. Call your doctor or get medical help if any of these side effects or any other side effects bother you or do not go away: If using for high blood sugar:    Constipation, diarrhea, stomach pain, upset stomach, or throwing up. Tablets:    Not hungry. If using for weight loss:    Constipation, diarrhea, stomach pain, upset stomach, or throwing up. Headache. Feeling dizzy, tired, or weak. Burping. Gas. These are not all of the side effects that may occur. If you have questions about side effects, call your doctor. Call your doctor for medical advice about side effects. You may report side effects to your national health agency. How is this drug best taken? Use this drug as ordered by your doctor. Read all information given to you. Follow all instructions closely. Tablets: Take at least 30 minutes before the first food, drink, or drugs of the day. Take with plain water only. Do not take with more than 4 ounces (120 mL) of water. Swallow whole. Do not chew, break, or crush. Keep taking this drug as you have been told by your doctor or other health care provider, even if you feel well. Prefilled syringes or pen: It is given as a shot into the fatty part of the skin on the top of the thigh, belly area, or upper arm. If you will be giving yourself the shot, your doctor or nurse will teach you how to give the shot. Take with or without food. Take the same day each week. Move the site where you give the shot with each shot. Do not use if the solution is cloudy, leaking, or has particles. Do not use if solution changes color. Wash your hands before and after use. Keep taking this drug as you have been told by your doctor or other health care provider, even if you feel well. Throw away needles in a needle/sharp disposal box. Do not reuse needles or other items.  When the box is full, follow all local rules for getting rid of it. Talk with a doctor or pharmacist if you have any questions. If using for high blood sugar:    Attach new needle before each dose. Take off the needle after each shot. Do not store this device with the needle on it. Put the cap back on after you are done using your dose. If you are also using insulin, you may inject this drug and the insulin in the same area of the body but not right next to each other. Do not mix this drug in the same syringe with insulin. If using for weight loss:    Each container is for one use only. Use right after opening. Throw away any part of the opened container after the dose is given. What do I do if I miss a dose? Tablets:    Skip the missed dose and go back to your normal time. Do not take 2 doses at the same time or extra doses. Prefilled syringes or pen: Take a missed dose as soon as you think about it and go back to your normal time. If it is less than 48 hours until your next dose, skip the missed and go back to your normal time. Do not take 2 doses within 48 hours of each other. If you miss 2 doses, call your doctor. How do I store and/or throw out this drug? Tablets:    Store in the original container at room temperature. Store in a dry place. Do not store in a bathroom. Prefilled syringes or pen:    Store unopened pens in a refrigerator. Do not freeze. Do not use if it has been frozen. Ozempic prefilled pens:    After opening, store in the refrigerator or at room temperature. Throw away any part not used after 56 days. Protect from heat and light. Wegovy prefilled pens: You may store unopened containers at room temperature. If you store at room temperature, throw away any part not used after 28 days. Store in the original container to protect from light. All products:    Keep all drugs in a safe place. Keep all drugs out of the reach of children and pets.     Throw away unused or  drugs. Do not flush down a toilet or pour down a drain unless you are told to do so. Check with your pharmacist if you have questions about the best way to throw out drugs. There may be drug take-back programs in your area. General drug facts    If your symptoms or health problems do not get better or if they become worse, call your doctor. Do not share your drugs with others and do not take anyone else's drugs. Some drugs may have another patient information leaflet. If you have any questions about this drug, please talk with your doctor, nurse, pharmacist, or other health care provider. If you think there has been an overdose, call your poison control center or get medical care right away. Be ready to tell or show what was taken, how much, and when it happened. Last Reviewed [id number]  Consumer Information Use and Disclaimer    This generalized information is a limited summary of diagnosis, treatment, and/or medication information. It is not meant to be comprehensive and should be used as a tool to help the user understand and/or assess potential diagnostic and treatment options. It does NOT include all information about conditions, treatments, medications, side effects, or risks that may apply to a specific patient. It is not intended to be medical advice or a substitute for the medical advice, diagnosis, or treatment of a health care provider based on the health care provider's examination and assessment of a patient's specific and unique circumstances. Patients must speak with a health care provider for complete information about their health, medical questions, and treatment options, including any risks or benefits regarding use of medications. This information does not endorse any treatments or medications as safe, effective, or approved for treating a specific patient. [employer] and its affiliates disclaim any warranty or liability relating to this information or the use thereof.  The use of this information is governed by the Terms of Use, available at Exacter[employer]uy. com/en/know/clinical-effectiveness-terms. © 2022 [employer] and its affiliates and/or [address]. All rights reserved.

## 2022-07-15 NOTE — PROGRESS NOTES
Chief Complaint   Patient presents with   [facility]       1. \"Have you been to the ER, urgent care clinic since your last visit? Hospitalized since your last visit? \" No    2. \"Have you seen or consulted any other health care providers outside of the [address] [place] [address] since your last visit? \" No     3. For patients aged 39-70: Has the patient had a colonoscopy / FIT/ Cologuard? No      If the patient is female:    4. For patients aged 41-77: Has the patient had a mammogram within the past 2 years? No      5. For patients aged 21-65: Has the patient had a pap smear?  No

## 2022-07-15 NOTE — TELEPHONE ENCOUNTER
JACY on 07/15/2022 at 11:15am for patient to call the office to schedule a NP consult visit per Luke Atkinson.

## 2022-07-16 LAB
ALBUMIN SERPL-MCNC: 4.6 G/DL (ref 3.8–4.9)
ALBUMIN/GLOB SERPL: 1.8 {RATIO} (ref 1.2–2.2)
ALP SERPL-CCNC: 117 IU/L (ref 44–121)
ALT SERPL-CCNC: 19 IU/L (ref 0–32)
AST SERPL-CCNC: 19 IU/L (ref 0–40)
BASOPHILS # BLD AUTO: 0 X10E3/UL (ref 0–0.2)
BASOPHILS NFR BLD AUTO: 1 %
BILIRUB SERPL-MCNC: 0.5 MG/DL (ref 0–1.2)
BUN SERPL-MCNC: 14 MG/DL (ref 6–24)
BUN/CREAT SERPL: 16 (ref 9–23)
CALCIUM SERPL-MCNC: 9.5 MG/DL (ref 8.7–10.2)
CHLORIDE SERPL-SCNC: 101 MMOL/L (ref 96–106)
CHOLEST SERPL-MCNC: 181 MG/DL (ref 100–199)
CO2 SERPL-SCNC: 24 MMOL/L (ref 20–29)
CREAT SERPL-MCNC: 0.87 MG/DL (ref 0.57–1)
EGFR: 77 ML/MIN/1.73
EOSINOPHIL # BLD AUTO: 0.5 X10E3/UL (ref 0–0.4)
EOSINOPHIL NFR BLD AUTO: 6 %
ERYTHROCYTE [DISTWIDTH] IN BLOOD BY AUTOMATED COUNT: 12.7 % (ref 11.7–15.4)
EST. AVERAGE GLUCOSE BLD GHB EST-MCNC: 123 MG/DL
GLOBULIN SER CALC-MCNC: 2.6 G/DL (ref 1.5–4.5)
GLUCOSE SERPL-MCNC: 123 MG/DL (ref 65–99)
HBA1C MFR BLD: 5.9 % (ref 4.8–5.6)
HCT VFR BLD AUTO: 47.8 % (ref 34–46.6)
HDLC SERPL-MCNC: 57 MG/DL
HGB BLD-MCNC: 16.1 G/DL (ref 11.1–15.9)
IMM GRANULOCYTES # BLD AUTO: 0 X10E3/UL (ref 0–0.1)
IMM GRANULOCYTES NFR BLD AUTO: 1 %
LDLC SERPL CALC-MCNC: 106 MG/DL (ref 0–99)
LYMPHOCYTES # BLD AUTO: 2.2 X10E3/UL (ref 0.7–3.1)
LYMPHOCYTES NFR BLD AUTO: 25 %
MCH RBC QN AUTO: 32.6 PG (ref 26.6–33)
MCHC RBC AUTO-ENTMCNC: 33.7 G/DL (ref 31.5–35.7)
MCV RBC AUTO: 97 FL (ref 79–97)
MONOCYTES # BLD AUTO: 0.4 X10E3/UL (ref 0.1–0.9)
MONOCYTES NFR BLD AUTO: 4 %
NEUTROPHILS # BLD AUTO: 5.7 X10E3/UL (ref 1.4–7)
NEUTROPHILS NFR BLD AUTO: 63 %
PLATELET # BLD AUTO: 255 X10E3/UL (ref 150–450)
POTASSIUM SERPL-SCNC: 3.9 MMOL/L (ref 3.5–5.2)
PROT SERPL-MCNC: 7.2 G/DL (ref 6–8.5)
RBC # BLD AUTO: 4.94 X10E6/UL (ref 3.77–5.28)
SODIUM SERPL-SCNC: 139 MMOL/L (ref 134–144)
TRIGL SERPL-MCNC: 99 MG/DL (ref 0–149)
VLDLC SERPL CALC-MCNC: 18 MG/DL (ref 5–40)
WBC # BLD AUTO: 8.8 X10E3/UL (ref 3.4–10.8)

## 2022-07-18 NOTE — PROGRESS NOTES
Notify patient. Labs generally unremarkable. Mild high cholesterol and prediabetes, recommend dietary modifications.

## 2022-07-20 LAB
ALBUMIN/CREAT UR: 5 MG/G CREAT (ref 0–29)
CREAT UR-MCNC: 204.3 MG/DL
DRUGS UR: NORMAL
MICROALBUMIN UR-MCNC: 11.1 UG/ML

## 2022-07-21 NOTE — PROGRESS NOTES
Please call the patient regarding her diagnostic evaluation. UDS did not detect use of Ritalin. Please confirm if patient was using this medication at the time of UDS. Repeat UDS will be needed in 1 month.

## 2022-07-25 ENCOUNTER — PATIENT MESSAGE (OUTPATIENT)
Dept: SLEEP MEDICINE | Age: 57
End: 2022-07-25

## 2022-07-27 ENCOUNTER — TELEPHONE (OUTPATIENT)
Dept: SLEEP MEDICINE | Age: 57
End: 2022-07-27

## 2022-07-27 NOTE — TELEPHONE ENCOUNTER
Phoned patient to schedule an appointment per  Dr. Aleyda Catalan. Patient was seen by Dr. Melody Bernard in 2018. At that appointment she was dissatisfied with his services. She has a referral to see Dr. Trina Cotto. I informed her that she would have to see a nurse practitioner moving forward. The patient is refusing to see an NP and does not want to see Dr. Melody Bernard again. Return call requested to see how we can best accommodate her. She can be reached at [phone].

## 2022-07-27 NOTE — PROGRESS NOTES
Patient was taking the medication at the time. Patient had mention that she does not take ritalin on her days off from work or take 1 pill.

## 2022-07-28 ENCOUNTER — TELEPHONE (OUTPATIENT)
Dept: SLEEP MEDICINE | Age: 57
End: 2022-07-28

## 2022-07-28 NOTE — TELEPHONE ENCOUNTER
MEGANM to schedule the patient with Dr. Dennis Dennison at any 4:20 slot as virtual or f2f new patient. As her last office visit was with Dr. Koko Kinney in 2018.

## 2022-08-22 DIAGNOSIS — G47.11 IDIOPATHIC HYPERSOMNIA: ICD-10-CM

## 2022-08-22 RX ORDER — METHYLPHENIDATE HYDROCHLORIDE 20 MG/1
20 TABLET ORAL 2 TIMES DAILY
Qty: 60 TABLET | Refills: 0 | Status: SHIPPED | OUTPATIENT
Start: 2022-08-22 | End: 2022-09-21

## 2022-08-22 NOTE — TELEPHONE ENCOUNTER
PCP: Marley Babb MD    Last appt: 7/15/2022  Future Appointments   Date Time Provider Jose Mina   9/8/2022  2:40 PM LAB ONLY [facility] BS AMB   10/6/2022  3:00 PM Tye Almanzar MD [facility] BS AMB   1/16/2023  1:00 PM Marley Babb MD [facility] BS AMB       Requested Prescriptions     Pending Prescriptions Disp Refills    methylphenidate HCl (RITALIN) 20 mg tablet 60 Tablet 0     Sig: Take 1 Tablet by mouth two (2) times a day for 30 days.  Max Daily Amount: 40 mg.       Prior labs and Blood pressures:  BP Readings from Last 3 Encounters:   07/15/22 130/88   12/28/21 134/87   09/02/21 138/80     Lab Results   Component Value Date/Time    Sodium 139 07/15/2022 12:00 AM    Potassium 3.9 07/15/2022 12:00 AM    Chloride 101 07/15/2022 12:00 AM    CO2 24 07/15/2022 12:00 AM    Glucose 123 (H) 07/15/2022 12:00 AM    BUN 14 07/15/2022 12:00 AM    Creatinine 0.87 07/15/2022 12:00 AM    BUN/Creatinine ratio 16 07/15/2022 12:00 AM    GFR est AA 82 06/24/2021 12:00 AM    GFR est non-AA 71 06/24/2021 12:00 AM    Calcium 9.5 07/15/2022 12:00 AM     Lab Results   Component Value Date/Time    Hemoglobin A1c 5.9 (H) 07/15/2022 12:00 AM     Lab Results   Component Value Date/Time    Cholesterol, total 181 07/15/2022 12:00 AM    HDL Cholesterol 57 07/15/2022 12:00 AM    LDL, calculated 106 (H) 07/15/2022 12:00 AM    LDL, calculated 87 06/08/2020 09:18 AM    VLDL, calculated 18 07/15/2022 12:00 AM    VLDL, calculated 16 06/08/2020 09:18 AM    Triglyceride 99 07/15/2022 12:00 AM     No results found for: Texie Sacks, [id number]    Lab Results   Component Value Date/Time    TSH 3.190 06/24/2021 12:00 AM

## 2022-09-08 ENCOUNTER — LAB ONLY (OUTPATIENT)
Dept: INTERNAL MEDICINE CLINIC | Age: 57
End: 2022-09-08

## 2022-09-08 DIAGNOSIS — G47.11 IDIOPATHIC HYPERSOMNIA: Primary | ICD-10-CM

## 2022-09-08 DIAGNOSIS — F33.0 MAJOR DEPRESSIVE DISORDER, RECURRENT, MILD (HCC): ICD-10-CM

## 2022-09-14 LAB — DRUGS UR: NORMAL

## 2022-09-16 DIAGNOSIS — G47.11 IDIOPATHIC HYPERSOMNIA: Primary | ICD-10-CM

## 2022-09-22 ENCOUNTER — PATIENT MESSAGE (OUTPATIENT)
Dept: INTERNAL MEDICINE CLINIC | Age: 57
End: 2022-09-22

## 2022-09-22 DIAGNOSIS — G47.11 IDIOPATHIC HYPERSOMNIA: Primary | ICD-10-CM

## 2022-09-22 NOTE — TELEPHONE ENCOUNTER
From: Aby Berkowitz  To: Marisela Alonzo MD  Sent: 9/22/2022 3:00 PM EDT  Subject: 9/26/2022 Lab    Dr Lincoln Mcmullen,    If your office has been unable to schedule a test to screen me for methylphenidate on Monday, may I please provide the the following Methylphenidate Dip Card Test strip that I had shipped express to my residence? The tests are individually factory sealed. [email or web address]/methylphenidate-mpd-dip-card-drug-test-25-box/    I have attached a copy of the package insert for your review. My  I tested ourselves using these. I took two (on separate dates) and both came back positive. Ronny Charlton took one, and his came back negative. Maybe not very scientific, but I have confidence in the results. I will also be happy to provide a test or two for your office should you wish to perform a control on a subject or subjects known NOT to take this medication. Please advise.      Ria Joyce

## 2022-09-26 ENCOUNTER — APPOINTMENT (OUTPATIENT)
Dept: INTERNAL MEDICINE CLINIC | Age: 57
End: 2022-09-26

## 2022-09-26 DIAGNOSIS — G47.11 IDIOPATHIC HYPERSOMNIA: ICD-10-CM

## 2022-09-26 DIAGNOSIS — G47.11 IDIOPATHIC HYPERSOMNIA: Primary | ICD-10-CM

## 2022-09-26 NOTE — TELEPHONE ENCOUNTER
Requested Prescriptions     Pending Prescriptions Disp Refills    methylphenidate HCl 20 mg CDES         RX refill request from the patient/pharmacy. Patient last seen 7/15/22 with labs, and next appt. scheduled for 1/16/23.

## 2022-09-28 DIAGNOSIS — G47.11 IDIOPATHIC HYPERSOMNIA: Primary | ICD-10-CM

## 2022-09-28 NOTE — PROGRESS NOTES
ICD-10-CM ICD-9-CM    1.  Idiopathic hypersomnia  G47.11 780.54 TOXASSURE SELECT 13 (MW)      Hdoan Morgan, UR

## 2022-09-30 ENCOUNTER — TELEPHONE (OUTPATIENT)
Dept: INTERNAL MEDICINE CLINIC | Age: 57
End: 2022-09-30

## 2022-09-30 DIAGNOSIS — G47.11 IDIOPATHIC HYPERSOMNIA: Primary | ICD-10-CM

## 2022-09-30 LAB
CREAT UR-MCNC: 78 MG/DL
DRUGS UR: NORMAL
ME-PHENIDATE UR QL CFM: PRESENT
PPAA UR QL: PRESENT
SYMPATHOMIMETICS UR QL CFM: NORMAL

## 2022-09-30 RX ORDER — METHYLPHENIDATE HYDROCHLORIDE 20 MG/1
20 TABLET ORAL 2 TIMES DAILY
Qty: 60 TABLET | Refills: 0 | Status: SHIPPED | OUTPATIENT
Start: 2022-09-30 | End: 2022-10-30

## 2022-09-30 NOTE — TELEPHONE ENCOUNTER
----- Message from Lukasz Chang sent at 9/30/2022 10:22 AM EDT -----  Regarding: Methylphenidate  Prescription was sent to pharmacy for extended release. They state they have faxed your office asking if this is correct since I have never taken it, but have not yet received a reply.

## 2022-09-30 NOTE — PROGRESS NOTES
Please call the patient regarding her diagnostic evaluation. The test for methylphenidate was positive, consistent with her prescription. Of note, the same TOXAssure select 15 (MW) test also showed methylphenidate as well. I note that this test was the same test as had been ordered previously which notably did not show methylphenidate being present. We can discuss the implications of this test at her next in-person visit, which should be scheduled within the next 2 months.

## 2022-10-04 DIAGNOSIS — G47.11 IDIOPATHIC HYPERSOMNIA: Primary | ICD-10-CM

## 2022-10-06 ENCOUNTER — OFFICE VISIT (OUTPATIENT)
Dept: SLEEP MEDICINE | Age: 57
End: 2022-10-06
Payer: COMMERCIAL

## 2022-10-06 VITALS
RESPIRATION RATE: 20 BRPM | HEIGHT: 66 IN | WEIGHT: 248 LBS | BODY MASS INDEX: 39.86 KG/M2 | DIASTOLIC BLOOD PRESSURE: 105 MMHG | TEMPERATURE: 97.3 F | HEART RATE: 98 BPM | OXYGEN SATURATION: 100 % | SYSTOLIC BLOOD PRESSURE: 162 MMHG

## 2022-10-06 DIAGNOSIS — G47.10 HYPERSOMNIA: ICD-10-CM

## 2022-10-06 DIAGNOSIS — E66.01 OBESITY, CLASS III, BMI 40-49.9 (MORBID OBESITY) (HCC): ICD-10-CM

## 2022-10-06 DIAGNOSIS — G47.33 OBSTRUCTIVE SLEEP APNEA (ADULT) (PEDIATRIC): Primary | ICD-10-CM

## 2022-10-06 PROCEDURE — 99204 OFFICE O/P NEW MOD 45 MIN: CPT | Performed by: INTERNAL MEDICINE

## 2022-10-06 NOTE — Clinical Note
Hi there Thanks for the referral. Her BP was elevated in office today. I asked her to monitor her BP and to see you if remains high. I ordered a home test for the apnea. If negative I will reevaluate to see if she has significant hypersomnia. I'm not crazy about the ritalin. She is currently getting filled by your office. I can take it over if you would like but I told her I would likely transition her to something else if she filled it with the sleep center.  [address]

## 2022-10-06 NOTE — PROGRESS NOTES
[address] [facility] [place]. [place], [address]  Tel.  [phone]  Fax. 100 [facility] 60  [place], [address] [facility]  Tel.  [phone]  Fax. [phone] [address]  Tel.  [phone]  Fax. [phone]         Subjective:      Aleksey Scott is an 61 y.o. female referred for evaluation for a sleep disorder. She complains of excessive daytime sleepiness associated with previous diagnosis of hypersomnia. She had a sleep study back in 2004. Report reviewed. It was negative for sleep apnea and MSLT showed MSL about 4 minutes. She has been maintained on ritalin but recently has been feeling more tired. .  Symptoms began several months ago, gradually worsening since that time. She usually can fall asleep in between 5-60 minutes. Family or house members note snoring, snorting. She denies falling asleep while driving. Aleksey Scott does not wake up frequently at night. She is not bothered by waking up too early and left unable to get back to sleep. She actually sleeps about 7 hours at night and wakes up about 1 times during the night. She does not work shifts: Katerina Stands She uses a smartwatch which has been showing nocturnal oxygen desaturations  Tia Vogel indicates she does not get too little sleep at night. Her bedtime is 0900. She awakens at 0600. She does take naps. She takes 1 naps a week lasting 1. She has the following observed behaviors: Light snoring, Loud snoring;  . 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 They have been short staffed so sometimes has to come in early  Charlo Sleepiness Score: 13   which reflect mild daytime drowsiness. No Known Allergies      Current Outpatient Medications:     methylphenidate HCl (RITALIN) 20 mg tablet, Take 1 Tablet by mouth two (2) times a day for 30 days.  Max Daily Amount: 40 mg., Disp: 60 Tablet, Rfl: 0    hydroCHLOROthiazide (HYDRODIURIL) 25 mg tablet, Take 1 Tablet by mouth daily. , Disp: 90 Tablet, Rfl: 3    citalopram (CeleXA) 20 mg tablet, Take 1 Tablet by mouth daily. , Disp: 90 Tablet, Rfl: 3    mesalamine (PENTASA) 500 mg CR capsule, Take 500 mg by mouth three (3) times daily. , Disp: , Rfl:      She  has a past medical history of Allergic rhinitis (12/20/2017), At risk for obstructive sleep apnea, Crohn's disease ([facility] [address] (12/20/2017), Depression (12/20/2017), Essential hypertension (12/20/2017), Idiopathic hypersomnia (12/20/2017), Morbid obesity (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 She  has a past surgical history that includes hx orthopaedic. She family history includes Cancer in her father; Diabetes in her father and mother; Hypertension in her mother. She  reports that she has never smoked. She has never used smokeless tobacco.     Review of Systems:  Constitutional:  +weight gain. Eyes:  No blurred vision.   CVS:  No significant chest pain  Pulm:  some exertional shortness of breath  GI:  No significant nausea or vomiting,+GERD  :  No significant nocturia  Musculoskeletal:  No significant joint pain at night  Skin:  No significant rashes  Neuro:  No significant dizziness   Psych: treated for depression    Sleep Review of Systems: notable for intermittent difficulty falling asleep; infrequent awakenings at night;  rare dreaming noted; no nightmares ; + early morning headaches; mild memory problems; no concentration issues       Objective:   Visit Vitals  BP (!) 162/105 (BP 1 Location: Left upper arm, BP Patient Position: Sitting, BP Cuff Size: Adult long)   Pulse 98   Temp 97.3 °F (36.3 °C) (Temporal)   Resp 20   Ht 5' 6\" (1.676 m)   Wt 248 lb (112.5 kg)   SpO2 100%   BMI 40.03 kg/m²         General:   Not in acute distress   Eyes:  Anicteric sclerae, no obvious strabismus   Nose:  No obvious nasal septum deviation    Oropharynx:   Class 3 oropharyngeal outlet, thick tongue base, enlarged and boggy uvula, low-lying soft palate, narrow tonsilo-pharyngeal pilars   Tonsils:   tonsils are present and normal   Neck:    ; midline trachea   Chest/Lungs:  Equal lung expansion, clear on auscultation    CVS:  Normal rate, regular rhythm; no JVD   Skin:  Warm to touch; no obvious rashes   Neuro:  No focal deficits ; no obvious tremor    Psych:  Normal affect,  normal countenance;          Assessment:       ICD-10-CM ICD-9-CM    1. Obstructive sleep apnea (adult) (pediatric)  G47.33 327.23 SLEEP STUDY UNATTENDED, 4 CHANNEL      2. Hypersomnia  G47.10 780.54       3. Obesity, Class III, BMI 40-49.9 (morbid obesity) ([facility] [address]  E66.01 278.01             Plan:     * The patient currently has a High Risk for having sleep apnea. STOP-BANG score 5.  * HSAT was ordered for initial evaluation. Treatment options for sleep apnea were reviewed. she would like to proceed with a trial of PAP if found to have significant apnea. * She was provided information on sleep apnea including coresponding risk factors and the importance of proper treatment. * Counseling was provided regarding proper sleep hygiene and safe driving. I have counseled the patient regarding the benefits of weight loss. 2. Idiopathic hypersomnia-testing is from 18 years ago. If home test negative for sleep apnea, we will proceed with a PSG/MSLT to reassess symptoms. This will be done after she retires. Proper sleep hygiene reviewed. She will need to be off the ritalin prior to testing. I reviewed side effects of ritalin with her. She was advised to check her blood pressure at home and see her PMD for further treatment if readings remain high. If BP remains high, ritalin may need to be discontinued. If hypersomnia shows on new testing, it would be beneficial to change her to a long acting stimulant in place of the ritalin. If MSLT findings within normal, I would recommend gradual taper of the ritalin. 3. Obesity - weight has been increasing.  I have discussed the relationship of weight to obstructive sleep apnea. I have advised her to start a weight loss plan. She plans to start when she retires in 6 weeks. . she understands that weight loss can reduce severity of sleep apnea and snoring. The treatment plan was reviewed with the patient in detail . she understands that the lead technologist will be calling her  with the results or notifying of results via [address] [place] [address],[facility] and assisting with the next step in the treatment plan as outlined today during the consultation with me. All of her questions were addressed. Thank you for allowing us to participate in your patient's medical care. We'll keep you updated on these investigations.   Electronically signed by    Harman Gómez MD  Diplomate in Sleep Medicine  [facility]   10/6/2022

## 2022-10-06 NOTE — PATIENT INSTRUCTIONS
217 [facility] [place]. [place], [address]  Tel.  [phone]  Fax. 100 [facility] 60  [place], [address] [facility]  Tel.  [phone]  Fax. [phone] [address]  Tel.  [phone]  Fax. [phone]     Sleep Apnea: After Your Visit  Your Care Instructions  Sleep apnea occurs when you frequently stop breathing for 10 seconds or longer during sleep. It can be mild to severe, based on the number of times per hour that you stop breathing or have slowed breathing. Blocked or narrowed airways in your nose, mouth, or throat can cause sleep apnea. Your airway can become blocked when your throat muscles and tongue relax during sleep. Sleep apnea is common, occurring in 1 out of 20 individuals. Individuals having any of the following characteristics should be evaluated and treated right away due to high risk and detrimental consequences from untreated sleep apnea:  Obesity  Congestive Heart failure  Atrial Fibrillation  Uncontrolled Hypertension  Type II Diabetes  Night-time Arrhythmias  Stroke  Pulmonary Hypertension  High-risk Driving Populations (pilots, truck drivers, etc.)  Patients Considering Weight-loss Surgery    How do you know you have sleep apnea? You probably have sleep apnea if you answer 'yes' to 3 or more of the following questions:  S - Have you been told that you Snore? T - Are you often Tired during the day? O - Has anyone Observed you stop breathing while sleeping? P- Do you have (or are being treated for) high blood Pressure? B - Are you obese (Body Mass Index > 35)? A - Is your Age 48years old or older? N - Is your Neck size greater than 16 inches? G - Are you male Gender? A sleep physician can prescribe a breathing device that prevents tissues in the throat from blocking your airway. Or your doctor may recommend using a dental device (oral breathing device) to help keep your airway open.  In some cases, surgery may be needed to remove enlarged tissues in the throat. Follow-up care is a key part of your treatment and safety. Be sure to make and go to all appointments, and call your doctor if you are having problems. It's also a good idea to know your test results and keep a list of the medicines you take. How can you care for yourself at home? Lose weight, if needed. It may reduce the number of times you stop breathing or have slowed breathing. Go to bed at the same time every night. Sleep on your side. It may stop mild apnea. If you tend to roll onto your back, sew a pocket in the back of your paEtsy top. Put a tennis ball into the pocket, and stitch the pocket shut. This will help keep you from sleeping on your back. Avoid alcohol and medicines such as sleeping pills and sedatives before bed. Do not smoke. Smoking can make sleep apnea worse. If you need help quitting, talk to your doctor about stop-smoking programs and medicines. These can increase your chances of quitting for good. Prop up the head of your bed 4 to 6 inches by putting bricks under the legs of the bed. Treat breathing problems, such as a stuffy nose, caused by a cold or allergies. Use a continuous positive airway pressure (CPAP) breathing machine if lifestyle changes do not help your apnea and your doctor recommends it. The machine keeps your airway from closing when you sleep. If CPAP does not help you, ask your doctor whether you should try other breathing machines. A bilevel positive airway pressure machine has two types of air pressureâone for breathing in and one for breathing out. Another device raises or lowers air pressure as needed while you breathe. If your nose feels dry or bleeds when using one of these machines, talk with your doctor about increasing moisture in the air. A humidifier may help.   If your nose is runny or stuffy from using a breathing machine, talk with your doctor about using decongestants or a corticosteroid nasal spray.  When should you call for help? Watch closely for changes in your health, and be sure to contact your doctor if:  You still have sleep apnea even though you have made lifestyle changes. You are thinking of trying a device such as CPAP. You are having problems using a CPAP or similar machine. Where can you learn more? Go to NextPage. Enter A441 in the search box to learn more about \"Sleep Apnea: After Your Visit. \"   © 5919-0191 Healthwise, Incorporated. Care instructions adapted under license by [facility] (which disclaims liability or warranty for this information). This care instruction is for use with your licensed healthcare professional. If you have questions about a medical condition or this instruction, always ask your healthcare professional. Ashlyn Collier any warranty or liability for your use of this information. PROPER SLEEP HYGIENE    What to avoid  Do not have drinks with caffeine, such as coffee or black tea, for 8 hours before bed. Do not smoke or use other types of tobacco near bedtime. Nicotine is a stimulant and can keep you awake. Avoid drinking alcohol late in the evening, because it can cause you to wake in the middle of the night. Do not eat a big meal close to bedtime. If you are hungry, eat a light snack. Do not drink a lot of water close to bedtime, because the need to urinate may wake you up during the night. Do not read or watch TV in bed. Use the bed only for sleeping and sexual activity. What to try  Go to bed at the same time every night, and wake up at the same time every morning. Do not take naps during the day. Keep your bedroom quiet, dark, and cool. Get regular exercise, but not within 3 to 4 hours of your bedtime. .  Sleep on a comfortable pillow and mattress. If watching the clock makes you anxious, turn it facing away from you so you cannot see the time.   If you worry when you lie down, start a worry book. Well before bedtime, write down your worries, and then set the book and your concerns aside. Try meditation or other relaxation techniques before you go to bed. If you cannot fall asleep, get up and go to another room until you feel sleepy. Do something relaxing. Repeat your bedtime routine before you go to bed again. Make your house quiet and calm about an hour before bedtime. Turn down the lights, turn off the TV, log off the computer, and turn down the volume on music. This can help you relax after a busy day. Drowsy Driving  The [address] [place] [address] Road Traffic Safety Administration cites drowsiness as a causing factor in more than 998,265 police reported crashes annually, resulting in 76,000 injuries and 1,500 deaths. Other surveys suggest 55% of people polled have driven while drowsy in the past year, 23% had fallen asleep but not crashed, 3% crashed, and 2% had and accident due to drowsy driving. Who is at risk? Young Drivers: One study of drowsy driving accidents states that 55% of the drivers were under 25 years. Of those, 75% were male. Shift Workers and Travelers: People who work overnight or travel across time zones frequently are at higher risk of experiencing Circadian Rhythm Disorders. They are trying to work and function when their body is programed to sleep. Sleep Deprived: Lack of sleep has a serious impact on your ability to pay attention or focus on a task. Consistently getting less than the average of 8 hours your body needs creates partial or cumulative sleep deprivation. Untreated Sleep Disorders: Sleep Apnea, Narcolepsy, R.L.S., and other sleep disorders (untreated) prevent a person from getting enough restful sleep. This leads to excessive daytime sleepiness and increases the risk for drowsy driving accidents by up to 7 times. Medications / Alcohol: Even over the counter medications can cause drowsiness.  Medications that impair a drivers attention should have a warning label. Alcohol naturally makes you sleepy and on its own can cause accidents. Combined with excessive drowsiness its effects are amplified. Signs of Drowsy Driving:   * You don't remember driving the last few miles   * You may drift out of your duke   * You are unable to focus and your thoughts wander   * You may yawn more often than normal   * You have difficulty keeping your eyes open / nodding off   * Missing traffic signs, speeding, or tailgating  Prevention-   Good sleep hygiene, lifestyle and behavioral choices have the most impact on drowsy driving. There is no substitute for sleep and the average person requires 8 hours nightly. If you find yourself driving drowsy, stop and sleep. Consider the sleep hygiene tips provided during your visit as well. Medication Refill Policy: Refills for all medications require 1 week advance notice. Please have your pharmacy fax a refill request. We are unable to fax, or call in \"controled substance\" medications and you will need to pick these prescriptions up from our office. Perkle Activation    Thank you for requesting access to Perkle. Please follow the instructions below to securely access and download your online medical record. Perkle allows you to send messages to your doctor, view your test results, renew your prescriptions, schedule appointments, and more. How Do I Sign Up? In your internet browser, go to [email or web address]. Click on the First Time User? Click Here link in the Sign In box. You will see the New Member Sign Up page. Enter your Perkle Access Code exactly as it appears below. You will not need to use this code after youve completed the sign-up process. If you do not sign up before the expiration date, you must request a new code. Perkle Access Code:  Activation code not generated  Current Perkle Status: Active (This is the date your Perkle access code will )    Enter the last four digits of your Social Security Number (xxxx) and Date of Birth (mm/dd/yyyy) as indicated and click Submit. You will be taken to the next sign-up page. Create a [employer] ID. This will be your [employer] login ID and cannot be changed, so think of one that is secure and easy to remember. Create a [employer] password. You can change your password at any time. Enter your Password Reset Question and Answer. This can be used at a later time if you forget your password. Enter your e-mail address. You will receive e-mail notification when new information is available in [address] Click Sign Up. You can now view and download portions of your medical record. Click the Prismatic link to download a portable copy of your medical information. Additional Information    If you have questions, please call [phone]. Remember, [employer] is NOT to be used for urgent needs. For medical emergencies, dial 911.

## 2022-10-10 ENCOUNTER — DOCUMENTATION ONLY (OUTPATIENT)
Dept: SLEEP MEDICINE | Age: 57
End: 2022-10-10

## 2022-10-19 NOTE — TELEPHONE ENCOUNTER
From: Kwabena Escamilla  To: Kamla Torres MD  Sent: 7/18/2020 1:25 PM EDT  Subject: Prescription Question    Can you please send a prescription for methylphenidate 20 mg to the [facility] on [address]?     Thanks,  Gabriel German Problems falling asleep. Then will sleep late. Has temporary custody of 2 yo. Increased anxiety.    ARLINE-7 SCREENING 10/19/2022 9/15/2022   Feeling nervous, anxious, or on edge Nearly every day Not at all   Not being able to stop or control worrying Several days Not at all   Worrying too much about different things Nearly every day Several days   Trouble relaxing Several days Not at all   Being so restless that it is hard to sit still More than half the days More than half the days   Becoming easily annoyed or irritable More than half the days Not at all   Feeling afraid as if something awful might happen Several days Not at all   ARLINE-7 Total Score 13 3

## 2022-12-07 ENCOUNTER — OFFICE VISIT (OUTPATIENT)
Dept: SLEEP MEDICINE | Age: 57
End: 2022-12-07

## 2022-12-07 DIAGNOSIS — G47.33 OSA (OBSTRUCTIVE SLEEP APNEA): Primary | ICD-10-CM

## 2022-12-07 DIAGNOSIS — G47.33 OBSTRUCTIVE SLEEP APNEA (ADULT) (PEDIATRIC): ICD-10-CM

## 2022-12-07 DIAGNOSIS — G47.10 HYPERSOMNIA: ICD-10-CM

## 2022-12-07 NOTE — PROGRESS NOTES
[address] [facility]., [facility]. [place], [address]  Tel.  [phone]  Fax. 100 [facility] 60  [place], [address] [facility]  Tel.  [phone]  Fax. [phone] [address] [facility] [address] [facility]  Tel.  [phone]  Fax. [phone]       S>Yamilet Nazario is a 61 y.o. female seen today to receive a home sleep testing unit (HST). Patient was educated on proper hookup and operation of the HST. Instruction forms and documentation were reviewed and signed. The patient demonstrated good understanding of the HST.    O>    There were no vitals taken for this visit. A>  No diagnosis found. P>  General information regarding operations and maintenance of the device was provided. She was provided information on sleep apnea including coresponding risk factors and the importance of proper treatment. Follow-up appointment was made to return the HST. She will be contacted once the results have been reviewed. She was asked to contact our office for any problems regarding her home sleep test study.      #[id number]

## 2022-12-08 DIAGNOSIS — G47.11 IDIOPATHIC HYPERSOMNIA: ICD-10-CM

## 2022-12-09 RX ORDER — METHYLPHENIDATE HYDROCHLORIDE 20 MG/1
20 TABLET ORAL 2 TIMES DAILY
Qty: 60 TABLET | Refills: 0 | Status: SHIPPED | OUTPATIENT
Start: 2022-12-09

## 2022-12-09 NOTE — TELEPHONE ENCOUNTER
Requested Prescriptions     Pending Prescriptions Disp Refills    methylphenidate HCl (RITALIN) 20 mg tablet 60 Tablet 0     Sig: Take 1 Tablet by mouth two (2) times a day. Max Daily Amount: 40 mg.       RX refill request from the patient/pharmacy. Patient last seen 7/15/22 with labs, and next appt. scheduled for 1/16/23.

## 2022-12-13 ENCOUNTER — TELEPHONE (OUTPATIENT)
Dept: SLEEP MEDICINE | Age: 57
End: 2022-12-13

## 2022-12-13 DIAGNOSIS — G47.33 OBSTRUCTIVE SLEEP APNEA (ADULT) (PEDIATRIC): Primary | ICD-10-CM

## 2022-12-16 ENCOUNTER — DOCUMENTATION ONLY (OUTPATIENT)
Dept: SLEEP MEDICINE | Age: 57
End: 2022-12-16

## 2022-12-16 NOTE — TELEPHONE ENCOUNTER
Results of sleep study in [facility]  Lead tech to convey results to patient    HSAT results in [facility]. Test positive for severe sleep apnea. AHI 36/hour and lowest oxygen saturation was 65%. Most respiratory events occurred when she was on her back. We had discussed treatment options at initial consultation. Based on the results of the home sleep apnea test, I believe a trial of APAP would be an effective mode of therapy. APAP order attached. she should be seen in the sleep disorder center 4-6 weeks after initiating PAP therapy. Until she is on PAP, she should avoid sleeping on her back. Patient should call the office the day she gets set up with new PAP device so we can schedule her for an adherence/compliance visit within 31-90 days of obtaining a new device. Front staff to Order PAP and call patient and let them know which DME company they should be hearing from after results reviewed with lead support technologist.     she will need a first adherence visit.

## 2023-01-12 DIAGNOSIS — G47.11 IDIOPATHIC HYPERSOMNIA: ICD-10-CM

## 2023-01-12 NOTE — TELEPHONE ENCOUNTER
Requested Prescriptions     Pending Prescriptions Disp Refills    methylphenidate HCl (RITALIN) 20 mg tablet 60 Tablet 0     Sig: Take 1 Tablet by mouth two (2) times a day. Max Daily Amount: 40 mg.       RX refill request from the patient/pharmacy. Patient last seen 7/15/22 with labs, and next appt. scheduled for 1/31/23.
WDL

## 2023-01-13 RX ORDER — METHYLPHENIDATE HYDROCHLORIDE 20 MG/1
20 TABLET ORAL 2 TIMES DAILY
Qty: 60 TABLET | Refills: 0 | Status: SHIPPED | OUTPATIENT
Start: 2023-01-13

## 2023-01-27 NOTE — PROGRESS NOTES
ICD-10-CM ICD-9-CM    1. Routine adult health maintenance  Z00.00 V70.0       2. Obesity, Class III, BMI 40-49.9 (morbid obesity) ([facility])  E66.01 278.01       3. Major depressive disorder, recurrent, mild ([facility])  F33.0 296.31       4. Crohn's disease without complication, unspecified gastrointestinal tract location ([facility]  K50.90 555.9       5. Idiopathic hypersomnia  G47.11 780.54 methylphenidate HCl (RITALIN) 20 mg tablet      6. Obstructive sleep apnea  G47.33 327.23                Subjective:     Chief Complaint   Patient presents with    Follow-up       Efe Conn is a 61 y.o. F.  she  has a past medical history of Allergic rhinitis (12/20/2017), Crohn's disease ([facility] (12/20/2017), Depression (12/20/2017), Essential hypertension (12/20/2017), Hypercholesterolemia (07/2022), Idiopathic hypersomnia (12/20/2017), Morbid obesity ([facility] Obstructive sleep apnea (12/2022), and Shift work sleep disorder (12/20/2017). her last appointment in this clinic was 9/26/2022 . I reviewed and updated the medical record. At the patient's most recent appointment with me in July, she had reported feeling about the same as usual.  She was using Ritalin on a regular basis for a previous history of idiopathic hypersomnia, a putative diagnosis provided to her by a different provider. She was using Pentasa for a history of Crohn's disease, and was felt to be symptomatically quiescent. Finally, she was held to have good control of her blood pressure with the use of hydrochlorothiazide. She was using Celexa at the time without any problems for her depression and anxiety. I had expressed skepticism of the diagnosis of idiopathic hypersomnia, noting the patient's severe morbid obesity, and had referred her for a sleep study, suspecting that her fatigue was likely secondary to obstructive sleep apnea. She was otherwise continued on her usual medication regimen.     Since that time, the patient was seen in the sleep clinic, where sleep testing had confirmed my suspicions for obstructive sleep apnea. Today, the patient comes in for routine follow-up. She reports feeling fine overall today and has no significant acute somatic complaints. She continues on CPAP as had been started by her sleep medicine physician, and so far, has noted some mild improvement in her sleep and energy level. That said, she says that her sleep medicine physician has advised for her to stay on methylphenidate for now, with a plan potentially to taper this off over time. Of note, also, the patient has not been taking her citalopram on a daily basis, thinking that she no longer really needs this medication given her recent long-term and improvement in her mood overall. She denies any depression, suicidal ideation, or other problems, and has been using this on an every other day basis now for about the past few weeks without any other significant change. She denies any other anxiety or difficulty sleeping or other problems. Finally, she continues on hydrochlorothiazide for her blood pressure. Her blood pressure readings at home are similar to what they are in clinic today and she denies any chest pain, shortness of breath, or any further cardiopulmonary symptoms, he denies any personal history of cardiovascular or cerebrovascular disease. Her review of systems is otherwise negative. Routine Healthcare Maintenance issues are reviewed and discussed with the patient as noted below. Orders to update gaps in healthcare maintenance were placed as noted below in the Assessment and Plan, where applicable. 10-year Cardiovascular Risk Marika Anderson, 2013):   The 10-year ASCVD risk score (Ale GARCIA, et al., 2019) is: 3%    Values used to calculate the score:      Age: 61 years      Sex: Female      Is Non- : No      Diabetic: No      Tobacco smoker: No      Systolic Blood Pressure: 820 mmHg      Is BP treated: Yes      HDL Cholesterol: 57 mg/dL      Total Cholesterol: 181 mg/dL     Past Medical History:  Past Medical History:   Diagnosis Date    Allergic rhinitis 12/20/2017    Crohn's disease ([facility] 12/20/2017    Depression 12/20/2017    Essential hypertension 12/20/2017    Hypercholesterolemia 07/2022    Idiopathic hypersomnia 12/20/2017    Morbid obesity ([facility]     Obstructive sleep apnea 12/2022    AHI 36    Shift work sleep disorder 12/20/2017       Past Surgical Histor:  Past Surgical History:   Procedure Laterality Date    HX ORTHOPAEDIC         Allergies:  No Known Allergies    Medications:  Current Outpatient Medications   Medication Sig Dispense Refill    [START ON 2/12/2023] methylphenidate HCl (RITALIN) 20 mg tablet Take 1 Tablet by mouth two (2) times a day. Max Daily Amount: 40 mg. 60 Tablet 0    citalopram (CELEXA) 10 mg tablet Take 1 Tablet by mouth daily for 15 days, THEN 1 Tablet every other day for 15 days. 22 Tablet 0    hydroCHLOROthiazide (HYDRODIURIL) 25 mg tablet Take 1 Tablet by mouth daily. 90 Tablet 3    mesalamine (PENTASA) 500 mg CR capsule Take 500 mg by mouth three (3) times daily.          Social History:  Social History     Socioeconomic History    Marital status:    Tobacco Use    Smoking status: Never    Smokeless tobacco: Never   Vaping Use    Vaping Use: Never used     Social Determinants of Health     Physical Activity: Unknown    Days of Exercise per Week: 0 days       Family History:  Family History   Problem Relation Age of Onset    Hypertension Mother     Diabetes Mother     Diabetes Father     Cancer Father        Immunizations:  Immunization History   Administered Date(s) Administered    COVID-19, PFIZER PURPLE top, DILUTE for use, (age 15 y+), IM, 30mcg/0.3mL 01/28/2021, 03/03/2021    Influenza Vaccine 11/02/2017, 10/15/2018, 10/20/2019, 10/27/2020    Influenza, FLUAD, (age 72 y+), Adjuvanted 12/28/2021    Influenza, FLUCELVAX, (age 10 mo+), MDCK, PF 10/15/2018, 10/20/2019, 10/23/2020, 10/27/2020    Tdap 07/28/2019    Zoster Recombinant 03/15/2019, 06/03/2019        Healthcare Maintenance:  Health Maintenance   Topic Date Due    COVID-19 Vaccine (3 - Booster for Pfizer series) 04/28/2021    Flu Vaccine (1) 08/01/2022    Depression Monitoring  07/15/2023    A1C test (Diabetic or Prediabetic)  07/15/2023    Breast Cancer Screen Mammogram  07/16/2023    Colorectal Cancer Screening Combo  11/12/2023    Cervical cancer screen  07/16/2024    Lipid Screen  07/15/2027    DTaP/Tdap/Td series (2 - Td or Tdap) 07/28/2029    Hepatitis C Screening  Completed    Shingles Vaccine  Completed    Pneumococcal 0-64 years  Aged Out        Review of Systems:  ROS:  Review of Systems   Constitutional: Negative. HENT: Negative. Eyes: Negative. Respiratory: Negative. Cardiovascular: Negative. Gastrointestinal: Negative. Genitourinary: Negative. Musculoskeletal: Negative. Skin: Negative. Neurological: Negative. Endo/Heme/Allergies: Negative. Psychiatric/Behavioral: Negative. ROS otherwise negative      Objective:     Vital Signs:  Visit Vitals  /84 (BP 1 Location: Left upper arm, BP Patient Position: Sitting, BP Cuff Size: Large adult)   Pulse (!) 102   Resp 20   Ht 5' 6\" (1.676 m)   Wt 247 lb 6.4 oz (112.2 kg)   SpO2 99%   BMI 39.93 kg/m²       BMI:  Body mass index is 39.93 kg/m². Physical Examination:  Physical Exam  Constitutional:       Appearance: Normal appearance. She is obese. HENT:      Head: Normocephalic and atraumatic. Right Ear: External ear normal.      Left Ear: External ear normal.      Nose: Nose normal.      Mouth/Throat:      Mouth: Mucous membranes are moist.      Pharynx: Oropharynx is clear. No oropharyngeal exudate or posterior oropharyngeal erythema. Cardiovascular:      Rate and Rhythm: Normal rate and regular rhythm. Pulses: Normal pulses. Heart sounds: Normal heart sounds. No murmur heard. No friction rub. No gallop.    Pulmonary:      Effort: Pulmonary effort is normal. No respiratory distress. Breath sounds: Normal breath sounds. No wheezing, rhonchi or rales. Abdominal:      General: Abdomen is flat. Bowel sounds are normal. There is no distension. Palpations: Abdomen is soft. Tenderness: There is no abdominal tenderness. There is no guarding. Musculoskeletal:         General: No swelling, tenderness or deformity. Normal range of motion. Cervical back: Normal range of motion and neck supple. No rigidity or tenderness. Skin:     General: Skin is warm and dry. Findings: No erythema, lesion or rash. Neurological:      General: No focal deficit present. Mental Status: She is alert and oriented to person, place, and time. Mental status is at baseline. Sensory: No sensory deficit. Motor: No weakness. Gait: Gait normal.      Deep Tendon Reflexes: Reflexes normal.   Psychiatric:         Mood and Affect: Mood normal.         Behavior: Behavior normal.         Judgment: Judgment normal.        Physical exam otherwise negative    Diagnostic Testing:    Laboratory Studies:  Appointment on 09/26/2022   Component Date Value Ref Range Status    Creatinine  urine 09/26/2022 78  >=20 mg/dL Final    Sympathomimetics 09/26/2022 ++POSITIVE++   Final    Methylphenidate 09/26/2022 PRESENT   Final    RITALINIC ACID 09/26/2022 PRESENT   Final    Summary 09/26/2022 Note   Final    Comment: ====================================================================  ToxASSURE Select 13 (MW)  Methylphenidate, ToxASSURE, UR  ====================================================================  Test                             Result       Flag       Units    Drug Present    Methylphenidate                PRESENT    Ritalinic Acid                 PRESENT     Source of methylphenidate is a scheduled prescription medication.      Ritalinic acid is an expected metabolite of methylphenidate.    ====================================================================  Test                      Result    Flag   Units      Ref Range    Creatinine              78               mg/dL      >=20  ====================================================================  Declared Medications:   Medication list was not provided.  ====================================================================  For clinical consultation, please call [phone].  ===================================================                           =================     Lab Only on 09/08/2022   Component Date Value Ref Range Status    Summary 09/08/2022 Note   Final    Comment: ====================================================================  ToxASSURE Select 13 (MW)  ====================================================================  Test                             Result       Flag       Units      NO DRUGS DETECTED.  ====================================================================  Test                      Result    Flag   Units      Ref Range    Creatinine              170              mg/dL      >=20  ====================================================================  Declared Medications:   Medication list was not provided.  ====================================================================  For clinical consultation, please call [phone].  ====================================================================           Radiographic Studies:  XR Results (most recent):  Results from Hospital Encounter encounter on 03/02/17    XR ARTHRO SHOULDER LT    Narrative  Left shoulder arthrogram    Indication: Left shoulder pain    Comparison: None. Findings: Preliminary images of the left shoulder show no evidence of fracture  or dislocation. The acromioclavicular and glenohumeral joints are intact.     After written and verbal consent was obtained from the patient explaining the  risk and benefits of the procedure, the patient was placed supine on the  fluoroscopy table. The left shoulder was prepped and draped in the normal  sterile fashion. Skin and subcutaneous tissues were anesthetized with lidocaine  with bicarbonate. Under fluoroscopic guidance, a 22-gauge spinal needle was  advanced into the left glenohumeral joint via the rotator interval. Needle tip  position was confirmed with a small amount of iodinated contrast. Subsequently  12 cc of a mixture of 10 cc normal saline, 5 cc iodinated contrast, 5 cc 1%  lidocaine and 0.05 mL of Gadavist were instilled. The needle was removed and a  bandage was placed. Patient tolerated the procedure well without immediate  complication. After mildly exercising the shoulder the post arthrogram images demonstrate  contrast appropriately within the joint. No evidence of a full-thickness rotator  cuff tear. .    Fluoroscopy time: 0.2 minutes    Fluoroscopy dose: 13.05 mGy    Impression  Impression:  1. Successful left shoulder arthrogram. MR to follow  2. No immediate complications  3. No evidence of a full-thickness rotator cuff tear. SHEREE Results (most recent):  No results found for this or any previous visit. CT Results (most recent):  No results found for this or any previous visit. DEXA Results (most recent):  No results found for this or any previous visit. MRI Results (most recent):  Results from [place] encounter on 03/02/17    MRI SHOULDER LT W CONT    Narrative  EXAM:  MRI SHOULDER LT W CONT    INDICATION: Left shoulder pain    COMPARISON: None. TECHNIQUE: MR arthrogram of the left shoulder performed after the plain  arthrographic portion of the examination utilizing axial T1 and proton density  fat-saturated; oblique coronal T1 fat-saturated and T2 fat-saturated; oblique  sagittal T1 fat-saturated; and Aber T1 fat saturated sequences . CONTRAST: Intra-articular gadolinium.     FINDINGS: A.C. joint: Intact Anterior acromion process type: 2    Bone marrow: A small focus of cystic change in the posterior greater tuberosity  related to entering vasculature. No acute fracture, dislocation, or marrow  replacing process. Joint fluid: Contrast is present in the glenohumeral joint. Contrast has  incidentally decompressed from the rotator interval since the arthrogram  procedure into the subcutaneous coracoid bursa and into the subcutaneous  acromial and subdeltoid bursa. The joint as this was not well distended by  contrast. The amount of contrast within the joint space however is still  diagnostic. Rotator cuff tendons: Intact. Biceps tendon: Intact and located within the bicipital groove. Muscles: No edema or atrophy. Glenoid labrum: Intact. Glenohumeral ligaments: within normal limits. Glenohumeral articular cartilage: Intact. No focal osteochondral lesion. Soft tissue mass: None. Impression  IMPRESSION:    1. The contrast has incidentally decompressed from the joint space through the  rotator interval into the subcoracoid, subacromial, and subdeltoid bursa since  the arthrogram procedure. Contrast within the joint space however remains  diagnostic. 2. No evidence of internal derangement of the joint. Assessment/Plan:       ICD-10-CM ICD-9-CM    1. Routine adult health maintenance  Z00.00 V70.0       2. Obesity, Class III, BMI 40-49.9 (morbid obesity) ([facility])  E66.01 278.01       3. Major depressive disorder, recurrent, mild ([facility])  F33.0 296.31       4. Crohn's disease without complication, unspecified gastrointestinal tract location ([facility]  K50.90 555.9       5. Idiopathic hypersomnia  G47.11 780.54 methylphenidate HCl (RITALIN) 20 mg tablet      6. Obstructive sleep apnea  G47.33 327.23              Idiopathic hypersomnia:  - At this point, doubt this diagnosis, anticipate being able to eventually titrate off the methylphenidate.   Continuing with current care for now, but deferring additional changes pending her reevaluation by sleep medicine while she is on treatment for her obstructive sleep apnea with CPAP. Healthcare Maintenance:  - Preventive measures are reviewed as per above  - Up to date on routine interventions except as noted above  - Orders placed to update gaps as noted  - Notes: Fasting labs from previous visit reviewed, repeat again next visit    Hyperlipidemia/Dyslipidemia:   - Summary of Cardiovascular Risks and Goals:     LDL goal is under 100  hypertension  hyperlipidemia  obese  [place] 10-year risk <10%   -   Lab Results   Component Value Date/Time    LDL, calculated 106 (H) 07/15/2022 12:00 AM    LDL, calculated 87 06/08/2020 09:18 AM    HDL Cholesterol 57 07/15/2022 12:00 AM       - Relevant Cholesterol Meds:  Key Antihyperlipidemia Meds       The patient is on no antihyperlipidemia meds.               - Cholesterol at target: no   - Does patient meet USPSTF and ACC/AHA indications for pharmacotherapy (e.g., statin): no   - GI symptoms with meds: N/A   - Muscle aches with meds: N/A   - Other Adverse effects with meds: N/A   - Medication Plan:  Defer   - Notes: ---      Essential Hypertension/Blood Pressure Management:   - Home BP Readings: not doing   - Current Control: optimal   - Target BP: <130/80 mmHg   - Relevant BP Meds:  Key CAD CHF Meds               hydroCHLOROthiazide (HYDRODIURIL) 25 mg tablet (Taking) Take 1 Tablet by mouth daily.               - Plan: continue current treatment regimen, continue current meds, dietary sodium restriction, regular aerobic exercise, weight loss   - Notes: CCM      (at risk for) Obstructive Sleep Apnea:     - CPAP: YES   - Notes: Recently started on CPAP [facility]; continuing with current care, anticipate being able to eventually wean off of methylphenidate, pulling off for now    Anxiety/Depression:   - Current PHQ: No data recorded    - Current RX:   Key Psychotherapeutic Meds               methylphenidate HCl (RITALIN) 20 mg tablet Starting on 2/12/2023. Take 1 Tablet by mouth two (2) times a day. Max Daily Amount: 40 mg.    citalopram (CELEXA) 10 mg tablet (Taking) Take 1 Tablet by mouth daily for 15 days, THEN 1 Tablet every other day for 15 days. Key Neurological Meds       The patient is on no neurologic meds. - Psychology/Psychiatry Co-managing? No   - Tapering off of citalopram, asymptomatic overall, continue to monitor for recurrence of symptoms, consider restarting citalopram in the future if symptoms should return, return precautions discussed with patient who endorses agreement and understanding      I have reviewed the patient's medical history in detail and updated the computerized patient record. We had a prolonged discussion about these complex clinical issues and went over the various important aspects to consider. All questions were answered. Advised the patient to call back or return to office if symptoms do not improve, change in nature, or persist.     The patient was given an after visit summary or informed of [facility] which includes patient instructions, diagnoses, current medications, & vitals. she expressed understanding with the diagnosis and plan. Sg Kern MD    Please note that this dictation was completed with [employer], the "[employer] voice recognition software. Quite often unanticipated grammatical, syntax, homophones, and other interpretive errors are inadvertently transcribed by the computer software. Please disregard these errors. Please excuse any errors that have escaped final proofreading.

## 2023-01-31 ENCOUNTER — OFFICE VISIT (OUTPATIENT)
Dept: INTERNAL MEDICINE CLINIC | Age: 58
End: 2023-01-31
Payer: COMMERCIAL

## 2023-01-31 VITALS
WEIGHT: 247.4 LBS | HEART RATE: 102 BPM | BODY MASS INDEX: 39.76 KG/M2 | RESPIRATION RATE: 20 BRPM | DIASTOLIC BLOOD PRESSURE: 84 MMHG | OXYGEN SATURATION: 99 % | HEIGHT: 66 IN | SYSTOLIC BLOOD PRESSURE: 124 MMHG

## 2023-01-31 DIAGNOSIS — Z00.00 ROUTINE ADULT HEALTH MAINTENANCE: Primary | ICD-10-CM

## 2023-01-31 DIAGNOSIS — F33.0 MAJOR DEPRESSIVE DISORDER, RECURRENT, MILD (HCC): ICD-10-CM

## 2023-01-31 DIAGNOSIS — E66.01 OBESITY, CLASS III, BMI 40-49.9 (MORBID OBESITY) (HCC): ICD-10-CM

## 2023-01-31 DIAGNOSIS — K50.90 CROHN'S DISEASE WITHOUT COMPLICATION, UNSPECIFIED GASTROINTESTINAL TRACT LOCATION (HCC): ICD-10-CM

## 2023-01-31 DIAGNOSIS — G47.11 IDIOPATHIC HYPERSOMNIA: ICD-10-CM

## 2023-01-31 DIAGNOSIS — G47.33 OBSTRUCTIVE SLEEP APNEA: ICD-10-CM

## 2023-01-31 PROCEDURE — 3079F DIAST BP 80-89 MM HG: CPT | Performed by: INTERNAL MEDICINE

## 2023-01-31 PROCEDURE — 3074F SYST BP LT 130 MM HG: CPT | Performed by: INTERNAL MEDICINE

## 2023-01-31 PROCEDURE — 99214 OFFICE O/P EST MOD 30 MIN: CPT | Performed by: INTERNAL MEDICINE

## 2023-01-31 RX ORDER — CITALOPRAM 10 MG/1
TABLET ORAL
Qty: 22 TABLET | Refills: 0 | Status: SHIPPED | OUTPATIENT
Start: 2023-01-31 | End: 2023-03-02

## 2023-01-31 RX ORDER — METHYLPHENIDATE HYDROCHLORIDE 20 MG/1
20 TABLET ORAL 2 TIMES DAILY
Qty: 60 TABLET | Refills: 0 | Status: SHIPPED | OUTPATIENT
Start: 2023-02-12

## 2023-01-31 NOTE — PROGRESS NOTES
Chief Complaint   Patient presents with    Follow-up     Visit Vitals  /84 (BP 1 Location: Left upper arm, BP Patient Position: Sitting, BP Cuff Size: Large adult)   Pulse (!) 102   Resp 20   Ht 5' 6\" (1.676 m)   Wt 247 lb 6.4 oz (112.2 kg)   SpO2 99%   BMI 39.93 kg/m²         1. \"Have you been to the ER, urgent care clinic since your last visit? Hospitalized since your last visit? \" No    2. \"Have you seen or consulted any other health care providers outside of the [address] [place] [address] since your last visit? \" No     3. For patients aged 39-70: Has the patient had a colonoscopy / FIT/ Cologuard? No      If the patient is female:    4. For patients aged 41-77: Has the patient had a mammogram within the past 2 years? No      5. For patients aged 21-65: Has the patient had a pap smear?  No

## 2023-02-27 ENCOUNTER — OFFICE VISIT (OUTPATIENT)
Dept: SLEEP MEDICINE | Age: 58
End: 2023-02-27
Payer: COMMERCIAL

## 2023-02-27 VITALS
BODY MASS INDEX: 39.73 KG/M2 | WEIGHT: 247.2 LBS | DIASTOLIC BLOOD PRESSURE: 72 MMHG | HEART RATE: 70 BPM | OXYGEN SATURATION: 98 % | SYSTOLIC BLOOD PRESSURE: 132 MMHG | HEIGHT: 66 IN | TEMPERATURE: 98 F

## 2023-02-27 DIAGNOSIS — G47.33 OSA (OBSTRUCTIVE SLEEP APNEA): Primary | ICD-10-CM

## 2023-02-27 DIAGNOSIS — G47.10 HYPERSOMNIA: ICD-10-CM

## 2023-02-27 DIAGNOSIS — E66.9 OBESITY, CLASS II, BMI 35-39.9: ICD-10-CM

## 2023-02-27 PROCEDURE — 99213 OFFICE O/P EST LOW 20 MIN: CPT | Performed by: INTERNAL MEDICINE

## 2023-02-27 PROCEDURE — 3075F SYST BP GE 130 - 139MM HG: CPT | Performed by: INTERNAL MEDICINE

## 2023-02-27 PROCEDURE — 3078F DIAST BP <80 MM HG: CPT | Performed by: INTERNAL MEDICINE

## 2023-02-27 NOTE — PROGRESS NOTES
[address] [facility]. [place], [address]  Tel.  [phone]  Fax. 100 [facility] 60  [place], [address] [facility]  Tel.  [phone]  Fax. [phone] [address]   Tel.  [phone]  Fax. [phone]     S>Yamilet Ya is a 60 y.o. female seen for a positive airway pressure follow-up. She reports no problems using the device. The following problems are identified:    Drowsiness Yes but much improved. She often does not take the afternoon ritalin dosage Problems exhaling no   Snoring no Forget to put on no   Mask Comfortable yes Can't fall asleep no   Dry Mouth Some but much better now that on CPAP Mask falls off no   Air Leaking no Frequent awakenings no       Download reviewed. She denies cataplexy,denies sleep paralysis and denies sleep-related hallucinations. She admits that her sleep has improved. Therapy Apnea Index averaged over PAP use: 9 /hr which reflects improved sleep breathing condition. No Known Allergies    She has a current medication list which includes the following prescription(s): methylphenidate hcl, hydrochlorothiazide, mesalamine, and citalopram..      She  has a past medical history of Allergic rhinitis (12/20/2017), Crohn's disease ([facility] (12/20/2017), Depression (12/20/2017), Essential hypertension (12/20/2017), Hypercholesterolemia (07/2022), Idiopathic hypersomnia (12/20/2017), Morbid obesity ([facility] Obstructive sleep apnea (12/2022), and Shift work sleep disorder (12/20/2017). [place] Sleepiness Score: 9   and Modified F.O.S.Q. Score Total / 2: 17   which reflect improved sleep quality over therapy time.     O>    Visit Vitals  /72 (BP 1 Location: Right upper arm, BP Patient Position: Sitting, BP Cuff Size: Adult long)   Pulse 70   Temp 98 °F (36.7 °C)   Ht 5' 6\" (1.676 m)   Wt 247 lb 3.2 oz (112.1 kg)   SpO2 98%   BMI 39.90 kg/m²           General:   Alert, oriented, not in distress   Neck:   No JVD    Chest/Lungs:  symetrical lung expansion , no accessory muscle use    Extremities:  no obvious rashes , negative edema    Neuro:  No focal deficits ; No obvious tremor    Psych:  Normal affect ,  Normal countenance ;           A>    ICD-10-CM ICD-9-CM    1. DENNIS (obstructive sleep apnea)  G47.33 327.23       2. Obesity, Class II, BMI 35-39.9  E66.9 278.00       3. Hypersomnia  G47.10 780.54         AHI = 36(12-22). On CPAP, 3B  :  5-15 cmH2O. Compliant:      yes    Therapeutic Response:  Positive    P>    she is compliant with PAP therapy and PAP continues to benefit patient and remains necessary for control of her sleep apnea. Download shows with residual events. * change settings to 8-13 cmH20  She will continue nightly use    * She was asked to contact our office for any problems regarding PAP therapy. * Counseling was provided regarding the importance of regular PAP use and on proper sleep hygiene and safe driving. * Re-enforced proper and regular cleaning for the device. 2. Obesity - weight has been stable. I have discussed the relationship of weight to obstructive sleep apnea. I have advised her to start a weight loss plan. We discussed reducing portions and avoiding beverages with calories. Exercise can further assist with weight loss/maintenance and was recommended. she understands that weight loss can reduce severity of sleep apnea and snoring. 3. Hypersomnia - will schedule psg/mslt for re-evaluation of hypersomnia. Ritalin to be held 5 days prior. Safe driving reviewed. She should not drive if sleepy. PSG to be done on CPAP 10 cmH20. She will be seen to review results.      Electronically signed by    Tristian Kelly MD  Diplomate in Sleep Medicine  [facility]  2/27/2023

## 2023-02-27 NOTE — PATIENT INSTRUCTIONS
217 [facility] [place]. [place], [address]  Tel.  [phone]  Fax. 100 [facility] 60  [place], [address] [facility]  Tel.  [phone]  Fax. [phone] [address] [address]  Tel.  [phone]  Fax. [phone]     PROPER SLEEP HYGIENE    What to avoid  Do not have drinks with caffeine, such as coffee or black tea, for 8 hours before bed. Do not smoke or use other types of tobacco near bedtime. Nicotine is a stimulant and can keep you awake. Avoid drinking alcohol late in the evening, because it can cause you to wake in the middle of the night. Do not eat a big meal close to bedtime. If you are hungry, eat a light snack. Do not drink a lot of water close to bedtime, because the need to urinate may wake you up during the night. Do not read or watch TV in bed. Use the bed only for sleeping and sexual activity. What to try  Go to bed at the same time every night, and wake up at the same time every morning. Do not take naps during the day. Keep your bedroom quiet, dark, and cool. Get regular exercise, but not within 3 to 4 hours of your bedtime. .  Sleep on a comfortable pillow and mattress. If watching the clock makes you anxious, turn it facing away from you so you cannot see the time. If you worry when you lie down, start a worry book. Well before bedtime, write down your worries, and then set the book and your concerns aside. Try meditation or other relaxation techniques before you go to bed. If you cannot fall asleep, get up and go to another room until you feel sleepy. Do something relaxing. Repeat your bedtime routine before you go to bed again. Make your house quiet and calm about an hour before bedtime. Turn down the lights, turn off the TV, log off the computer, and turn down the volume on music. This can help you relax after a busy day.     Drowsy Driving  The [address] [place] [address] Road Traffic Safety Administration cites drowsiness as a causing factor in more than 976,294 police reported crashes annually, resulting in 76,000 injuries and 1,500 deaths. Other surveys suggest 55% of people polled have driven while drowsy in the past year, 23% had fallen asleep but not crashed, 3% crashed, and 2% had and accident due to drowsy driving. Who is at risk? Young Drivers: One study of drowsy driving accidents states that 55% of the drivers were under 25 years. Of those, 75% were male. Shift Workers and Travelers: People who work overnight or travel across time zones frequently are at higher risk of experiencing Circadian Rhythm Disorders. They are trying to work and function when their body is programed to sleep. Sleep Deprived: Lack of sleep has a serious impact on your ability to pay attention or focus on a task. Consistently getting less than the average of 8 hours your body needs creates partial or cumulative sleep deprivation. Untreated Sleep Disorders: Sleep Apnea, Narcolepsy, R.L.S., and other sleep disorders (untreated) prevent a person from getting enough restful sleep. This leads to excessive daytime sleepiness and increases the risk for drowsy driving accidents by up to 7 times. Medications / Alcohol: Even over the counter medications can cause drowsiness. Medications that impair a drivers attention should have a warning label. Alcohol naturally makes you sleepy and on its own can cause accidents. Combined with excessive drowsiness its effects are amplified. Signs of Drowsy Driving:   * You don't remember driving the last few miles   * You may drift out of your duke   * You are unable to focus and your thoughts wander   * You may yawn more often than normal   * You have difficulty keeping your eyes open / nodding off   * Missing traffic signs, speeding, or tailgating  Prevention-   Good sleep hygiene, lifestyle and behavioral choices have the most impact on drowsy driving.  There is no substitute for sleep and the average person requires 8 hours nightly. If you find yourself driving drowsy, stop and sleep. Consider the sleep hygiene tips provided during your visit as well. Medication Refill Policy: Refills for all medications require 1 week advance notice. Please have your pharmacy fax a refill request. We are unable to fax, or call in \"controled substance\" medications and you will need to pick these prescriptions up from our office. Atlas PoweredharTagaPet Activation    Thank you for requesting access to Divesquare. Please follow the instructions below to securely access and download your online medical record. Divesquare allows you to send messages to your doctor, view your test results, renew your prescriptions, schedule appointments, and more. How Do I Sign Up? In your internet browser, go to [email or web address]. Click on the First Time User? Click Here link in the Sign In box. You will see the New Member Sign Up page. Enter your Divesquare Access Code exactly as it appears below. You will not need to use this code after youve completed the sign-up process. If you do not sign up before the expiration date, you must request a new code. Divesquare Access Code: Activation code not generated  Current Divesquare Status: Active (This is the date your Divesquare access code will )    Enter the last four digits of your Social Security Number (xxxx) and Date of Birth (mm/dd/yyyy) as indicated and click Submit. You will be taken to the next sign-up page. Create a Divesquare ID. This will be your Divesquare login ID and cannot be changed, so think of one that is secure and easy to remember. Create a Divesquare password. You can change your password at any time. Enter your Password Reset Question and Answer. This can be used at a later time if you forget your password. Enter your e-mail address. You will receive e-mail notification when new information is available in [employer]aobei. Click Sign Up.  You can now view and download portions of your medical record. Click the Sqwiggle link to download a portable copy of your medical information. Additional Information    If you have questions, please call [phone]. Remember, Gateshop is NOT to be used for urgent needs. For medical emergencies, dial 911.

## 2023-04-02 ENCOUNTER — HOSPITAL ENCOUNTER (OUTPATIENT)
Dept: SLEEP MEDICINE | Age: 58
Discharge: HOME | End: 2023-04-02
Payer: COMMERCIAL

## 2023-04-02 DIAGNOSIS — G47.10 HYPERSOMNIA: ICD-10-CM

## 2023-04-02 PROCEDURE — 95811 POLYSOM 6/>YRS CPAP 4/> PARM: CPT | Performed by: INTERNAL MEDICINE

## 2023-04-03 ENCOUNTER — HOSPITAL ENCOUNTER (OUTPATIENT)
Dept: SLEEP MEDICINE | Age: 58
End: 2023-04-03
Payer: COMMERCIAL

## 2023-04-03 DIAGNOSIS — G47.10 HYPERSOMNIA: ICD-10-CM

## 2023-04-03 PROCEDURE — 95805 MULTIPLE SLEEP LATENCY TEST: CPT | Performed by: INTERNAL MEDICINE

## 2023-04-03 NOTE — PROGRESS NOTES
Acquisition Notes:  Lights off: 2234    Respiratory events:   A---Y    H--N  C---Y  M---N  ECG:    Arrhythmias   N  Snoring:  No  Sleep Stages:  REM   Y  PAP titration:   Eliminated events: Y  Reduced events:    Y  Events at final pressure N  Leak:  0

## 2023-04-05 ENCOUNTER — TELEPHONE (OUTPATIENT)
Dept: SLEEP MEDICINE | Age: 58
End: 2023-04-05

## 2023-04-05 ENCOUNTER — PATIENT MESSAGE (OUTPATIENT)
Dept: SLEEP MEDICINE | Age: 58
End: 2023-04-05

## 2023-04-05 NOTE — TELEPHONE ENCOUNTER
Psg/MSLT in chart  Schedule ffup appointment in person. Advise her to bring PAP device for her so we can adjust settings further  at that time.                                                                              SL 38 min, SE 77%,  min,  Preserved sleep architecture, 21% N3, 26% REM,  min  MSL 7:30 min, absent SOREMS  Plan-adjust PAP to 12-15 cmH20  Awaiting UA results

## 2023-04-05 NOTE — TELEPHONE ENCOUNTER
Patient called and lvm 04/05/23 at 10:59 am asking if she should start taking her medicine again? She can be reached at [phone]. Thanks.

## 2023-04-05 NOTE — TELEPHONE ENCOUNTER
Psg in chart.  Mslt to follow                                                    SL 38 min, SE 77%,  min,  Preserved sleep architecture, 21% N3, 26% REM,  min

## 2023-04-06 ENCOUNTER — DOCUMENTATION ONLY (OUTPATIENT)
Dept: SLEEP MEDICINE | Age: 58
End: 2023-04-06

## 2023-04-24 ENCOUNTER — OFFICE VISIT (OUTPATIENT)
Dept: SLEEP MEDICINE | Age: 58
End: 2023-04-24
Payer: COMMERCIAL

## 2023-04-24 VITALS
DIASTOLIC BLOOD PRESSURE: 71 MMHG | WEIGHT: 251 LBS | SYSTOLIC BLOOD PRESSURE: 116 MMHG | BODY MASS INDEX: 40.34 KG/M2 | HEIGHT: 66 IN | OXYGEN SATURATION: 94 % | HEART RATE: 79 BPM

## 2023-04-24 DIAGNOSIS — G47.33 OSA (OBSTRUCTIVE SLEEP APNEA): Primary | ICD-10-CM

## 2023-04-24 DIAGNOSIS — G47.10 HYPERSOMNIA: ICD-10-CM

## 2023-04-24 PROCEDURE — 99213 OFFICE O/P EST LOW 20 MIN: CPT | Performed by: INTERNAL MEDICINE

## 2023-04-24 PROCEDURE — 3078F DIAST BP <80 MM HG: CPT | Performed by: INTERNAL MEDICINE

## 2023-04-24 PROCEDURE — 3074F SYST BP LT 130 MM HG: CPT | Performed by: INTERNAL MEDICINE

## 2023-04-24 NOTE — PROGRESS NOTES
[address] [facility] [address] [place]. [place], [address]  Tel.  [phone]  Fax. 100 [facility] 60  [place], [address]  Tel.  [phone]  Fax. [phone] [address] [place] [facility] [address]  Tel.  [phone]  Fax. [phone]     S>Yamilet Shea is a 60 y.o. female seen for a positive airway pressure follow-up. She reports no problems using the device. The following problems are identified:    Drowsiness Yes but much less drowsy. She has not taken ritalin since her MSLT/senior living. Still has a hard time getting going in the morning Problems exhaling no   Snoring no Forget to put on no   Mask Comfortable yes Can't fall asleep no   Dry Mouth no Mask falls off no   Air Leaking no Frequent awakenings no       PSG done on PAP followed by MSLT. Loves the [place] mask  MSL 7:30 min, absent SOREMS.(Consistent with moderate hypersomnia)  Settings on her APAP adjusted to APAP 12-15 cm on 4/10/23. -she feels the new setting is better. She admits that her sleep has improved. Therapy Apnea Index averaged over PAP use: 3 /hr which reflects improved sleep breathing condition. No Known Allergies    She has a current medication list which includes the following prescription(s): hydrochlorothiazide, mesalamine, and methylphenidate hcl. .      She  has a past medical history of Allergic rhinitis (12/20/2017), Crohn's disease ([address] (12/20/2017), Depression (12/20/2017), Essential hypertension (12/20/2017), Hypercholesterolemia (07/2022), Idiopathic hypersomnia (12/20/2017), Morbid obesity ([address] Obstructive sleep apnea (12/2022), and Shift work sleep disorder (12/20/2017). [place] Sleepiness Score: 8   and Modified F.O.S.Q. Score Total / 2: 15.5   which reflect improved sleep quality over therapy time.     O>    Visit Vitals  /71 (BP 1 Location: Left upper arm, BP Patient Position: Sitting, BP Cuff Size: Adult long)   Pulse 79   Ht 5' 6\" (1.676 m)   Wt 251 lb (113.9 kg) SpO2 94%   BMI 40.51 kg/m²           General:   Alert, oriented, not in distress   Neck:   No JVD    Chest/Lungs:  symetrical lung expansion , no accessory muscle use    Extremities:  no obvious rashes , negative edema    Neuro:  No focal deficits ; No obvious tremor    Psych:  Normal affect ,  Normal countenance ;           A>    ICD-10-CM ICD-9-CM    1. DENNIS (obstructive sleep apnea)  G47.33 327.23       2. Hypersomnia  G47.10 780.54         AHI = 36(12-22). On CPAP, 3B  :  12-15 cmH2O. Compliant:      yes    Therapeutic Response:  Positive    P>    DENNIS/Hypersomnia with DENNIS  * she will remain off ritalin for now. She can take 10 mg (half tablet) if needed (if she feels excessive daytime sleepiness return). We discussed long acting stimulants to replace the ritalin. She will call if she feels she wants to resume a stimulant for hypersomnia. Can try nuvigil/modafinil and if she gets palpitations from it again, consider Sunosi. She will continue to ensure 7-8 hours of sleep nightly and take short naps if needed. I have counseled the patient regarding the benefits of weight loss. She was encouraged to exercise daily now that she is retired. * She was asked to contact our office for any problems regarding PAP therapy. * Counseling was provided regarding the importance of regular PAP use and on proper sleep hygiene and safe driving. * Re-enforced proper and regular cleaning for the device.     All of her questions addressed  Electronically signed by    Lilia Roa MD  Diplomate in Sleep Medicine  [facility]  4/24/2023

## 2023-04-24 NOTE — PATIENT INSTRUCTIONS
217 [facility] [place]. [place], [address]  Tel.  [phone]  Fax. 100 [facility] 60  [place], [address] [facility]  Tel.  [phone]  Fax. [phone] [address] [place] [address]  Tel.  [phone]  Fax. [phone]     PROPER SLEEP HYGIENE    What to avoid  Do not have drinks with caffeine, such as coffee or black tea, for 8 hours before bed. Do not smoke or use other types of tobacco near bedtime. Nicotine is a stimulant and can keep you awake. Avoid drinking alcohol late in the evening, because it can cause you to wake in the middle of the night. Do not eat a big meal close to bedtime. If you are hungry, eat a light snack. Do not drink a lot of water close to bedtime, because the need to urinate may wake you up during the night. Do not read or watch TV in bed. Use the bed only for sleeping and sexual activity. What to try  Go to bed at the same time every night, and wake up at the same time every morning. Do not take naps during the day. Keep your bedroom quiet, dark, and cool. Get regular exercise, but not within 3 to 4 hours of your bedtime. .  Sleep on a comfortable pillow and mattress. If watching the clock makes you anxious, turn it facing away from you so you cannot see the time. If you worry when you lie down, start a worry book. Well before bedtime, write down your worries, and then set the book and your concerns aside. Try meditation or other relaxation techniques before you go to bed. If you cannot fall asleep, get up and go to another room until you feel sleepy. Do something relaxing. Repeat your bedtime routine before you go to bed again. Make your house quiet and calm about an hour before bedtime. Turn down the lights, turn off the TV, log off the computer, and turn down the volume on music. This can help you relax after a busy day.     Drowsy Driving  The [address] [place] [address] Road Traffic Safety Administration cites drowsiness as a causing factor in more than 145,063 police reported crashes annually, resulting in 76,000 injuries and 1,500 deaths. Other surveys suggest 55% of people polled have driven while drowsy in the past year, 23% had fallen asleep but not crashed, 3% crashed, and 2% had and accident due to drowsy driving. Who is at risk? Young Drivers: One study of drowsy driving accidents states that 55% of the drivers were under 25 years. Of those, 75% were male. Shift Workers and Travelers: People who work overnight or travel across time zones frequently are at higher risk of experiencing Circadian Rhythm Disorders. They are trying to work and function when their body is programed to sleep. Sleep Deprived: Lack of sleep has a serious impact on your ability to pay attention or focus on a task. Consistently getting less than the average of 8 hours your body needs creates partial or cumulative sleep deprivation. Untreated Sleep Disorders: Sleep Apnea, Narcolepsy, R.L.S., and other sleep disorders (untreated) prevent a person from getting enough restful sleep. This leads to excessive daytime sleepiness and increases the risk for drowsy driving accidents by up to 7 times. Medications / Alcohol: Even over the counter medications can cause drowsiness. Medications that impair a drivers attention should have a warning label. Alcohol naturally makes you sleepy and on its own can cause accidents. Combined with excessive drowsiness its effects are amplified. Signs of Drowsy Driving:   * You don't remember driving the last few miles   * You may drift out of your duke   * You are unable to focus and your thoughts wander   * You may yawn more often than normal   * You have difficulty keeping your eyes open / nodding off   * Missing traffic signs, speeding, or tailgating  Prevention-   Good sleep hygiene, lifestyle and behavioral choices have the most impact on drowsy driving.  There is no substitute for sleep and the average person requires 8 hours nightly. If you find yourself driving drowsy, stop and sleep. Consider the sleep hygiene tips provided during your visit as well. Medication Refill Policy: Refills for all medications require 1 week advance notice. Please have your pharmacy fax a refill request. We are unable to fax, or call in \"controled substance\" medications and you will need to pick these prescriptions up from our office. [email or web address] Activation    Thank you for requesting access to [email or web address]. Please follow the instructions below to securely access and download your online medical record. [email or web address] allows you to send messages to your doctor, view your test results, renew your prescriptions, schedule appointments, and more. How Do I Sign Up? In your internet browser, go to [email or web address]. Click on the First Time User? Click Here link in the Sign In box. You will see the New Member Sign Up page. Enter your [email or web address] Access Code exactly as it appears below. You will not need to use this code after youve completed the sign-up process. If you do not sign up before the expiration date, you must request a new code. [email or web address] Access Code: Activation code not generated  Current [email or web address] Status: Active (This is the date your [email or web address] access code will )    Enter the last four digits of your Social Security Number (xxxx) and Date of Birth (mm/dd/yyyy) as indicated and click Submit. You will be taken to the next sign-up page. Create a [email or web address] ID. This will be your [email or web address] login ID and cannot be changed, so think of one that is secure and easy to remember. Create a [email or web address] password. You can change your password at any time. Enter your Password Reset Question and Answer. This can be used at a later time if you forget your password. Enter your e-mail address. You will receive e-mail notification when new information is available in [address] Click Sign Up.  You can now view and download portions of your medical record. Click the Glisten link to download a portable copy of your medical information. Additional Information    If you have questions, please call [phone]. Remember, Adelph[employer] Mobile is NOT to be used for urgent needs. For medical emergencies, dial 911.

## 2023-08-16 ENCOUNTER — TELEPHONE (OUTPATIENT)
Age: 58
End: 2023-08-16

## 2023-08-16 NOTE — TELEPHONE ENCOUNTER
Patient phoned the office requesting a letter, stating that she needs to be seated next to the power outlet on the plane. She will be traveling in November and would like to stay in compliance with PAP.

## 2023-10-20 RX ORDER — HYDROCHLOROTHIAZIDE 25 MG/1
25 TABLET ORAL DAILY
Qty: 30 TABLET | Refills: 0 | Status: SHIPPED | OUTPATIENT
Start: 2023-10-20

## 2023-10-20 NOTE — TELEPHONE ENCOUNTER
Requested Prescriptions     Pending Prescriptions Disp Refills    hydroCHLOROthiazide (HYDRODIURIL) 25 MG tablet 30 tablet 0     Sig: Take 1 tablet by mouth daily **MUST ESTABLISH CARE WITH A PROVIDER FOR FURTHER REFILLS**       RX refill request from the patient/pharmacy. Patient last seen 1/31/23 with labs, and next appt. scheduled for no future appointments.

## 2023-10-21 ASSESSMENT — SLEEP AND FATIGUE QUESTIONNAIRES
HOW LIKELY ARE YOU TO NOD OFF OR FALL ASLEEP WHILE SITTING INACTIVE IN A PUBLIC PLACE: 0
HOW LIKELY ARE YOU TO NOD OFF OR FALL ASLEEP WHILE SITTING AND READING: 1
HOW LIKELY ARE YOU TO NOD OFF OR FALL ASLEEP WHILE SITTING QUIETLY AFTER LUNCH WITHOUT ALCOHOL: 0
HOW LIKELY ARE YOU TO NOD OFF OR FALL ASLEEP WHILE WATCHING TV: 1
HOW LIKELY ARE YOU TO NOD OFF OR FALL ASLEEP IN A CAR, WHILE STOPPED FOR A FEW MINUTES IN TRAFFIC: 0
HOW LIKELY ARE YOU TO NOD OFF OR FALL ASLEEP WHILE LYING DOWN TO REST IN THE AFTERNOON WHEN CIRCUMSTANCES PERMIT: MODERATE CHANCE OF DOZING
HOW LIKELY ARE YOU TO NOD OFF OR FALL ASLEEP WHILE LYING DOWN TO REST IN THE AFTERNOON WHEN CIRCUMSTANCES PERMIT: 2
HAS YOUR RELATIONSHIP WITH FAMILY, FRIENDS OR WORK COLLEAGUES BEEN AFFECTED BECAUSE YOU ARE SLEEPY OR TIRED: NO
DO YOU HAVE DIFFICULTY CONCENTRATING ON THE THINGS YOU DO BECAUSE YOU ARE SLEEPY OR TIRED: NO
HOW LIKELY ARE YOU TO NOD OFF OR FALL ASLEEP WHEN YOU ARE A PASSENGER IN A CAR FOR AN HOUR WITHOUT A BREAK: 1
DO YOU HAVE DIFFICULTY BEING AS ACTIVE AS YOU WANT TO BE IN THE MORNING BECAUSE YOU ARE SLEEPY OR TIRED: NO
HOW LIKELY ARE YOU TO NOD OFF OR FALL ASLEEP WHILE SITTING AND TALKING TO SOMEONE: WOULD NEVER DOZE
ESS TOTAL SCORE: 5
HOW LIKELY ARE YOU TO NOD OFF OR FALL ASLEEP WHILE SITTING INACTIVE IN A PUBLIC PLACE: WOULD NEVER DOZE
DO YOU HAVE DIFFICULTY BEING AS ACTIVE AS YOU WANT TO BE IN THE EVENING BECAUSE YOU ARE SLEEPY OR TIRED: YES, LITTLE
DO YOU HAVE DIFFICULTY VISITING YOUR FAMILY OR FRIENDS IN THEIR HOME BECAUSE YOU BECOME SLEEPY OR TIRED: NO
DO YOU HAVE DIFFICULTY OPERATING A MOTOR VEHICLE FOR SHORT DISTANCES (LESS THAN 100 MILES) BECAUSE YOU BECOME SLEEPY: NO
HAS YOUR MOOD BEEN AFFECTED BECAUSE YOU ARE SLEEPY OR TIRED: NO
HOW LIKELY ARE YOU TO NOD OFF OR FALL ASLEEP WHILE WATCHING TV: SLIGHT CHANCE OF DOZING
DO YOU GENERALLY HAVE DIFFICULTY REMEMBERING THINGS BECAUSE YOU ARE SLEEPY OR TIRED: NO
HOW LIKELY ARE YOU TO NOD OFF OR FALL ASLEEP WHEN YOU ARE A PASSENGER IN A CAR FOR AN HOUR WITHOUT A BREAK: SLIGHT CHANCE OF DOZING
FOSQ SCORE: 19
DO YOU HAVE DIFFICULTY OPERATING A MOTOR VEHICLE FOR LONG DISTANCES (GREATER THAN 100 MILES) BECAUSE YOU BECOME SLEEPY: NO
HOW LIKELY ARE YOU TO NOD OFF OR FALL ASLEEP IN A CAR, WHILE STOPPED FOR A FEW MINUTES IN TRAFFIC: WOULD NEVER DOZE
HOW LIKELY ARE YOU TO NOD OFF OR FALL ASLEEP WHILE SITTING AND READING: SLIGHT CHANCE OF DOZING
HOW LIKELY ARE YOU TO NOD OFF OR FALL ASLEEP WHILE SITTING QUIETLY AFTER LUNCH WITHOUT ALCOHOL: WOULD NEVER DOZE
DO YOU HAVE DIFFICULTY WATCHING A MOVIE OR VIDEO BECAUSE YOU BECOME SLEEPY OR TIRED: YES, A LITTLE
HOW LIKELY ARE YOU TO NOD OFF OR FALL ASLEEP WHILE SITTING AND TALKING TO SOMEONE: 0

## 2023-10-24 ENCOUNTER — CLINICAL DOCUMENTATION (OUTPATIENT)
Age: 58
End: 2023-10-24

## 2023-10-24 ENCOUNTER — OFFICE VISIT (OUTPATIENT)
Age: 58
End: 2023-10-24
Payer: COMMERCIAL

## 2023-10-24 VITALS
BODY MASS INDEX: 43.79 KG/M2 | DIASTOLIC BLOOD PRESSURE: 76 MMHG | TEMPERATURE: 98 F | SYSTOLIC BLOOD PRESSURE: 122 MMHG | HEART RATE: 74 BPM | WEIGHT: 272.5 LBS | HEIGHT: 66 IN | OXYGEN SATURATION: 95 %

## 2023-10-24 DIAGNOSIS — G47.33 OBSTRUCTIVE SLEEP APNEA (ADULT) (PEDIATRIC): Primary | ICD-10-CM

## 2023-10-24 DIAGNOSIS — E66.01 SEVERE OBESITY (BMI >= 40) (HCC): ICD-10-CM

## 2023-10-24 PROCEDURE — 3078F DIAST BP <80 MM HG: CPT | Performed by: INTERNAL MEDICINE

## 2023-10-24 PROCEDURE — 99213 OFFICE O/P EST LOW 20 MIN: CPT | Performed by: INTERNAL MEDICINE

## 2023-10-24 PROCEDURE — 3074F SYST BP LT 130 MM HG: CPT | Performed by: INTERNAL MEDICINE

## 2023-10-24 NOTE — PATIENT INSTRUCTIONS
[address] [facility] [place], [address]  Tel.  [phone]  Fax. [address] [place] [place], [address]  Tel.  [phone]  Fax. [phone] [facility], [address] [facility]  Tel.  [phone]  Fax. [phone]     PROPER SLEEP HYGIENE    What to avoid  Do not have drinks with caffeine, such as coffee or black tea, for 8 hours before bed. Do not smoke or use other types of tobacco near bedtime. Nicotine is a stimulant and can keep you awake. Avoid drinking alcohol late in the evening, because it can cause you to wake in the middle of the night. Do not eat a big meal close to bedtime. If you are hungry, eat a light snack. Do not drink a lot of water close to bedtime, because the need to urinate may wake you up during the night. Do not read or watch TV in bed. Use the bed only for sleeping and sexual activity. What to try  Go to bed at the same time every night, and wake up at the same time every morning. Do not take naps during the day. Keep your bedroom quiet, dark, and cool. Get regular exercise, but not within 3 to 4 hours of your bedtime. .  Sleep on a comfortable pillow and mattress. If watching the clock makes you anxious, turn it facing away from you so you cannot see the time. If you worry when you lie down, start a worry book. Well before bedtime, write down your worries, and then set the book and your concerns aside. Try meditation or other relaxation techniques before you go to bed. If you cannot fall asleep, get up and go to another room until you feel sleepy. Do something relaxing. Repeat your bedtime routine before you go to bed again. Make your house quiet and calm about an hour before bedtime. Turn down the lights, turn off the TV, log off the computer, and turn down the volume on music. This can help you relax after a busy day.     Drowsy Driving  The [address] [place] [address] Traffic Safety Administration cites drowsiness as a [address] Comprehensive

## 2023-10-24 NOTE — PROGRESS NOTES
[address] [facility] [place], [address]  Tel.  [phone]  Fax. [phone] [facility], [address] [place] [address]  Tel.  [phone]  Fax. [phone] [facility], [address] [facility]  Tel.  [phone]  Fax. [phone]     S>Courtney Paulino is a 60 y.o. female seen for a positive airway pressure follow-up. She reports no problems using the device. The following problems are identified:    Drowsiness No-90% improved -she has not taken ritalin since May Problems exhaling no   Snoring no Forget to put on no   Mask Comfortable yes Can't fall asleep no   Dry Mouth no Mask falls off no   Air Leaking no Frequent awakenings no     Estimated AHI flow from download shows 1/hour. Averaging almos  9/hours use on nights used  Days with usage 97%  Adherence 97%  Weight from last visit 251 lb    He has not taken ritalin since may 2023. She admits that her sleep has significantly improved. No Known Allergies    She has a current medication list which includes the following prescription(s): hydrochlorothiazide and mesalamine. .      She  has a past medical history of Allergic rhinitis, Crohn's disease ([address]), Depression, Essential hypertension, Hypercholesterolemia, Idiopathic hypersomnia, Morbid obesity ([address]), Obstructive sleep apnea, and Shift work sleep disorder.         10/21/2023    12:35 PM 4/21/2023     8:37 AM 2/24/2023    12:20 PM 10/3/2022     1:18 PM   Sleep Medicine   Sitting and reading 1 2 1 3   Watching TV 1 1 1 2   Sitting, inactive in a public place (e.g. a theatre or a meeting) 0 0 0 1   As a passenger in a car for an hour without a break 1 1 3 2   Lying down to rest in the afternoon when circumstances permit 2 3 3 3   Sitting and talking to someone 0 0 0 0   Sitting quietly after a lunch without alcohol 0 1 1 2   In a car, while stopped for a few minutes in traffic 0 0 0 0   McDonald Sleepiness Score 5 8 9 13   which reflect improved sleep quality

## 2024-02-26 ENCOUNTER — CLINICAL DOCUMENTATION (OUTPATIENT)
Age: 59
End: 2024-02-26

## 2024-02-27 NOTE — PROGRESS NOTES
Returned patient's call regarding status of [facility] Serious Medical Condition Form.  Informed patient this was completed as of 2/26/2024 as indicated above.

## 2024-04-02 ENCOUNTER — TELEPHONE (OUTPATIENT)
Age: 59
End: 2024-04-02

## 2024-04-02 DIAGNOSIS — G47.33 OBSTRUCTIVE SLEEP APNEA (ADULT) (PEDIATRIC): Primary | ICD-10-CM

## 2024-04-02 NOTE — TELEPHONE ENCOUNTER
Order attached  Machelle my  chart messages  She wants this printed so she can obtain device out of pocket

## 2024-06-05 ENCOUNTER — HOSPITAL ENCOUNTER (OUTPATIENT)
Facility: HOSPITAL | Age: 59
Discharge: HOME | End: 2024-06-08
Attending: INTERNAL MEDICINE
Payer: COMMERCIAL

## 2024-06-05 DIAGNOSIS — K50.00 CICATRIZING ENTEROCOLITIS (HCC): ICD-10-CM

## 2024-06-05 PROCEDURE — 74177 CT ABD & PELVIS W/CONTRAST: CPT

## 2024-06-05 PROCEDURE — 6360000004 HC RX CONTRAST MEDICATION: Performed by: RADIOLOGY

## 2024-06-05 RX ADMIN — IOPAMIDOL 100 ML: 755 INJECTION, SOLUTION INTRAVENOUS at 07:16

## 2024-10-25 ASSESSMENT — SLEEP AND FATIGUE QUESTIONNAIRES
HAS YOUR MOOD BEEN AFFECTED BECAUSE YOU ARE SLEEPY OR TIRED: NO
HAS YOUR RELATIONSHIP WITH FAMILY, FRIENDS OR WORK COLLEAGUES BEEN AFFECTED BECAUSE YOU ARE SLEEPY OR TIRED: NO
DO YOU HAVE DIFFICULTY OPERATING A MOTOR VEHICLE FOR SHORT DISTANCES (LESS THAN 100 MILES) BECAUSE YOU BECOME SLEEPY: NO
DO YOU HAVE DIFFICULTY OPERATING A MOTOR VEHICLE FOR LONG DISTANCES (GREATER THAN 100 MILES) BECAUSE YOU BECOME SLEEPY: NO
HOW LIKELY ARE YOU TO NOD OFF OR FALL ASLEEP WHILE LYING DOWN TO REST IN THE AFTERNOON WHEN CIRCUMSTANCES PERMIT: MODERATE CHANCE OF DOZING
DO YOU HAVE DIFFICULTY VISITING YOUR FAMILY OR FRIENDS IN THEIR HOME BECAUSE YOU BECOME SLEEPY OR TIRED: NO
DO YOU HAVE DIFFICULTY BEING AS ACTIVE AS YOU WANT TO BE IN THE MORNING BECAUSE YOU ARE SLEEPY OR TIRED: NO
HOW LIKELY ARE YOU TO NOD OFF OR FALL ASLEEP WHEN YOU ARE A PASSENGER IN A CAR FOR AN HOUR WITHOUT A BREAK: WOULD NEVER DOZE
HOW LIKELY ARE YOU TO NOD OFF OR FALL ASLEEP WHILE SITTING INACTIVE IN A PUBLIC PLACE: WOULD NEVER DOZE
FOSQ SCORE: 20
HOW LIKELY ARE YOU TO NOD OFF OR FALL ASLEEP WHILE WATCHING TV: SLIGHT CHANCE OF DOZING
ESS TOTAL SCORE: 4
HOW LIKELY ARE YOU TO NOD OFF OR FALL ASLEEP WHILE WATCHING TV: SLIGHT CHANCE OF DOZING
HOW LIKELY ARE YOU TO NOD OFF OR FALL ASLEEP WHILE SITTING QUIETLY AFTER LUNCH WITHOUT ALCOHOL: WOULD NEVER DOZE
HOW LIKELY ARE YOU TO NOD OFF OR FALL ASLEEP IN A CAR, WHILE STOPPED FOR A FEW MINUTES IN TRAFFIC: WOULD NEVER DOZE
HOW LIKELY ARE YOU TO NOD OFF OR FALL ASLEEP WHILE SITTING INACTIVE IN A PUBLIC PLACE: WOULD NEVER DOZE
HOW LIKELY ARE YOU TO NOD OFF OR FALL ASLEEP WHILE SITTING AND TALKING TO SOMEONE: WOULD NEVER DOZE
DO YOU GENERALLY HAVE DIFFICULTY REMEMBERING THINGS BECAUSE YOU ARE SLEEPY OR TIRED: NO
DO YOU HAVE DIFFICULTY BEING AS ACTIVE AS YOU WANT TO BE IN THE EVENING BECAUSE YOU ARE SLEEPY OR TIRED: NO
HOW LIKELY ARE YOU TO NOD OFF OR FALL ASLEEP WHILE SITTING AND TALKING TO SOMEONE: WOULD NEVER DOZE
HOW LIKELY ARE YOU TO NOD OFF OR FALL ASLEEP WHILE LYING DOWN TO REST IN THE AFTERNOON WHEN CIRCUMSTANCES PERMIT: MODERATE CHANCE OF DOZING
DO YOU HAVE DIFFICULTY WATCHING A MOVIE OR VIDEO BECAUSE YOU BECOME SLEEPY OR TIRED: NO
HOW LIKELY ARE YOU TO NOD OFF OR FALL ASLEEP WHILE SITTING QUIETLY AFTER LUNCH WITHOUT ALCOHOL: WOULD NEVER DOZE
DO YOU HAVE DIFFICULTY CONCENTRATING ON THE THINGS YOU DO BECAUSE YOU ARE SLEEPY OR TIRED: NO
HOW LIKELY ARE YOU TO NOD OFF OR FALL ASLEEP WHEN YOU ARE A PASSENGER IN A CAR FOR AN HOUR WITHOUT A BREAK: WOULD NEVER DOZE
HOW LIKELY ARE YOU TO NOD OFF OR FALL ASLEEP WHILE SITTING AND READING: SLIGHT CHANCE OF DOZING
HOW LIKELY ARE YOU TO NOD OFF OR FALL ASLEEP IN A CAR, WHILE STOPPED FOR A FEW MINUTES IN TRAFFIC: WOULD NEVER DOZE
HOW LIKELY ARE YOU TO NOD OFF OR FALL ASLEEP WHILE SITTING AND READING: SLIGHT CHANCE OF DOZING

## 2024-10-28 ENCOUNTER — CLINICAL DOCUMENTATION (OUTPATIENT)
Age: 59
End: 2024-10-28

## 2024-10-28 ENCOUNTER — OFFICE VISIT (OUTPATIENT)
Age: 59
End: 2024-10-28
Payer: COMMERCIAL

## 2024-10-28 VITALS
HEART RATE: 62 BPM | DIASTOLIC BLOOD PRESSURE: 76 MMHG | OXYGEN SATURATION: 95 % | TEMPERATURE: 97.6 F | BODY MASS INDEX: 39.53 KG/M2 | WEIGHT: 246 LBS | HEIGHT: 66 IN | SYSTOLIC BLOOD PRESSURE: 127 MMHG

## 2024-10-28 DIAGNOSIS — I10 ESSENTIAL HYPERTENSION: ICD-10-CM

## 2024-10-28 DIAGNOSIS — G47.33 OSA (OBSTRUCTIVE SLEEP APNEA): Primary | ICD-10-CM

## 2024-10-28 PROCEDURE — 3078F DIAST BP <80 MM HG: CPT | Performed by: INTERNAL MEDICINE

## 2024-10-28 PROCEDURE — 99214 OFFICE O/P EST MOD 30 MIN: CPT | Performed by: INTERNAL MEDICINE

## 2024-10-28 PROCEDURE — 3074F SYST BP LT 130 MM HG: CPT | Performed by: INTERNAL MEDICINE

## 2024-10-28 RX ORDER — BALSALAZIDE DISODIUM 750 MG/1
CAPSULE ORAL
COMMUNITY
Start: 2024-08-26

## 2024-10-28 RX ORDER — ERGOCALCIFEROL 1.25 MG/1
CAPSULE, LIQUID FILLED ORAL
COMMUNITY
Start: 2024-09-29

## 2024-10-28 NOTE — PATIENT INSTRUCTIONS
[address]  [place] [address]  Tel.  [phone]  Fax. [phone] [address]  [place] [address]  Tel.  [phone]  Fax. [phone] [address] [place] [address]  Tel.  [phone]  Fax. [phone]     PROPER SLEEP HYGIENE    What to avoid  Do not have drinks with caffeine, such as coffee or black tea, for 8 hours before bed.  Do not smoke or use other types of tobacco near bedtime. Nicotine is a stimulant and can keep you awake.  Avoid drinking alcohol late in the evening, because it can cause you to wake in the middle of the night.  Do not eat a big meal close to bedtime. If you are hungry, eat a light snack.  Do not drink a lot of water close to bedtime, because the need to urinate may wake you up during the night.  Do not read or watch TV in bed. Use the bed only for sleeping and sexual activity.  What to try  Go to bed at the same time every night, and wake up at the same time every morning. Do not take naps during the day.  Keep your bedroom quiet, dark, and cool.  Get regular exercise, but not within 3 to 4 hours of your bedtime..  Sleep on a comfortable pillow and mattress.  If watching the clock makes you anxious, turn it facing away from you so you cannot see the time.  If you worry when you lie down, start a worry book. Well before bedtime, write down your worries, and then set the book and your concerns aside.  Try meditation or other relaxation techniques before you go to bed.  If you cannot fall asleep, get up and go to another room until you feel sleepy. Do something relaxing. Repeat your bedtime routine before you go to bed again.  Make your house quiet and calm about an hour before bedtime. Turn down the lights, turn off the TV, log off the computer, and turn down the volume on music. This can help you relax after a busy day.    Drowsy Driving  The [place] National Highway Traffic Safety Administration cites drowsiness as a

## 2024-10-28 NOTE — PROGRESS NOTES
(Site: Left Upper Arm, Position: Sitting, Cuff Size: Medium Adult)   Pulse 62   Temp 97.6 °F (36.4 °C)   Ht 1.676 m (5' 6\")   Wt 111.6 kg (246 lb)   SpO2 95%   BMI 39.71 kg/m²         General:   Alert, oriented, not in distress   Neck:   No JVD    Chest/Lungs:  symetrical lung expansion , no accessory muscle use    Extremities:  no obvious rashes , negative edema    Neuro:  No focal deficits ; No obvious tremor    Psych:  Normal affect ,  Normal countenance ;         A>   Diagnosis Orders   1. AYO (obstructive sleep apnea)  DME Order for (Specify) as OP      2. Essential hypertension           AHI = 36(12-22).  CPAP Resmed AS 11  :  12-15 cmH2O     Compliant:      yes    Therapeutic Response:  Positive    P>       Sleep apnea appears well controlled. Patient adherent to therapy. Plan to continue current settings.   I have ordered replacement supplies.   She will let me know if she loses more weight and if feels too strong.    * She was asked to contact our office for any problems regarding PAP therapy.    * Counseling was provided regarding the importance of regular PAP use and on proper sleep hygiene and safe driving.    * Re-enforced proper and regular cleaning for the device.    2. Hypertension - controlled on current regimen. she will continue her current regimen. she will continue to monitor at home and with her PMD for further adjustments as needed.    I have reviewed the relationship between hypertension as it relates to sleep-disordered breathing.      Electronically signed by    Jazmín Miller MD  Diplomate in Sleep Medicine  [facility]  10/28/2024

## 2025-06-22 ENCOUNTER — TRANSCRIBE ORDERS (OUTPATIENT)
Facility: HOSPITAL | Age: 60
End: 2025-06-22

## 2025-06-22 DIAGNOSIS — K76.0 HEPATIC STEATOSIS: ICD-10-CM

## 2025-06-22 DIAGNOSIS — K50.919 CROHN'S DISEASE WITH COMPLICATION, UNSPECIFIED GASTROINTESTINAL TRACT LOCATION (HCC): Primary | ICD-10-CM

## 2025-06-30 ENCOUNTER — HOSPITAL ENCOUNTER (OUTPATIENT)
Facility: HOSPITAL | Age: 60
Discharge: HOME | End: 2025-07-03
Payer: COMMERCIAL

## 2025-06-30 DIAGNOSIS — K76.0 HEPATIC STEATOSIS: ICD-10-CM

## 2025-06-30 DIAGNOSIS — K50.919 CROHN'S DISEASE WITH COMPLICATION, UNSPECIFIED GASTROINTESTINAL TRACT LOCATION (HCC): ICD-10-CM

## 2025-06-30 PROCEDURE — 76981 USE PARENCHYMA: CPT
